# Patient Record
Sex: FEMALE | Employment: OTHER | URBAN - METROPOLITAN AREA
[De-identification: names, ages, dates, MRNs, and addresses within clinical notes are randomized per-mention and may not be internally consistent; named-entity substitution may affect disease eponyms.]

---

## 2023-05-25 LAB
ABO (EXTERNAL): NORMAL
HEPATITIS B SURFACE ANTIGEN (EXTERNAL): NEGATIVE
HEPATITIS C ANTIBODY (EXTERNAL): NEGATIVE
HIV1+2 AB SERPL QL IA: NONREACTIVE
RH (EXTERNAL): NEGATIVE
RUBELLA ANTIBODY IGG (EXTERNAL): NORMAL
TREPONEMA PALLIDUM ANTIBODY (EXTERNAL): NONREACTIVE

## 2023-06-12 ENCOUNTER — TRANSFERRED RECORDS (OUTPATIENT)
Dept: HEALTH INFORMATION MANAGEMENT | Facility: CLINIC | Age: 33
End: 2023-06-12

## 2023-07-19 ENCOUNTER — VIRTUAL VISIT (OUTPATIENT)
Dept: OBGYN | Facility: CLINIC | Age: 33
End: 2023-07-19
Payer: COMMERCIAL

## 2023-07-19 ENCOUNTER — TRANSCRIBE ORDERS (OUTPATIENT)
Dept: ULTRASOUND IMAGING | Facility: CLINIC | Age: 33
End: 2023-07-19

## 2023-07-19 DIAGNOSIS — Z34.90 SUPERVISION OF NORMAL PREGNANCY: Primary | ICD-10-CM

## 2023-07-19 DIAGNOSIS — O26.90 PREGNANCY RELATED CONDITION, ANTEPARTUM: Primary | ICD-10-CM

## 2023-07-19 PROBLEM — G43.909 MIGRAINES: Status: ACTIVE | Noted: 2023-05-25

## 2023-07-19 PROBLEM — O36.8390 FETAL ARRHYTHMIA AFFECTING PREGNANCY, ANTEPARTUM: Status: ACTIVE | Noted: 2020-05-15

## 2023-07-19 PROBLEM — O98.519 COVID-19 AFFECTING PREGNANCY, ANTEPARTUM: Status: ACTIVE | Noted: 2023-05-25

## 2023-07-19 PROBLEM — U07.1 COVID-19 AFFECTING PREGNANCY, ANTEPARTUM: Status: ACTIVE | Noted: 2023-05-25

## 2023-07-19 PROCEDURE — 99207 PR NO CHARGE NURSE ONLY: CPT

## 2023-07-19 RX ORDER — VITAMIN A ACETATE, .BETA.-CAROTENE, ASCORBIC ACID, CHOLECALCIFEROL, .ALPHA.-TOCOPHEROL ACETATE, DL-, THIAMINE MONONITRATE, RIBOFLAVIN, NIACINAMIDE, PYRIDOXINE HYDROCHLORIDE, FOLIC ACID, CYANOCOBALAMIN, CALCIUM CARBONATE, FERROUS FUMARATE, ZINC OXIDE, AND CUPRIC OXIDE 2000; 2000; 120; 400; 22; 1.84; 3; 20; 10; 1; 12; 200; 27; 25; 2 [IU]/1; [IU]/1; MG/1; [IU]/1; MG/1; MG/1; MG/1; MG/1; MG/1; MG/1; UG/1; MG/1; MG/1; MG/1; MG/1
1 TABLET ORAL DAILY
COMMUNITY

## 2023-07-19 NOTE — PROGRESS NOTES
NPN nurse visit done over the phone. Pt will be given NPN folder and book at her upcoming appt.   Discussed optional screening available to assess chromosomal anomalies. Questions answered. Pt advised to call the clinic if she has any questions or concerns related to her pregnancy. Prenatal labs will be obtained at her upcoming appt. New prenatal visit scheduled on 8/2/23 with Ekta Aragon CNM.    16w3d    Last pap: 11/4/20 NIL        Patient supplied answers from flow sheet for:  Prenatal OB Questionnaire.  Past Medical History  Have you ever recieved care for your mental health? : No  Have you ever been in a major accident or suffered serious trauma?: No  Within the last year, has anyone hit, slapped, kicked or otherwise hurt you?: No  In the last year, has anyone forced you to have sex when you didn't want to?: No    Past Medical History 2   Have you ever received a blood transfusion?: No  Would you accept a blood transfusion if was medically recommended?: Yes  Does anyone in your home smoke?: No   Is your blood type Rh negative?: No  Have you ever ?: (!) Yes  Have you been hospitalized for a nonsurgical reason excluding normal delivery?: No  Have you ever had an abnormal pap smear?: (!) Yes (had colp)    Past Medical History (Continued)  Do you have a history of abnormalities of the uterus?: No  Did your mother take LUIS A or any other hormones when she was pregnant with you?: No  Do you have any other problems we have not asked about which you feel may be important to this pregnancy?: No

## 2023-08-02 ENCOUNTER — PRENATAL OFFICE VISIT (OUTPATIENT)
Dept: MIDWIFE SERVICES | Facility: CLINIC | Age: 33
End: 2023-08-02
Payer: COMMERCIAL

## 2023-08-02 ENCOUNTER — PRE VISIT (OUTPATIENT)
Dept: MATERNAL FETAL MEDICINE | Facility: CLINIC | Age: 33
End: 2023-08-02

## 2023-08-02 VITALS
WEIGHT: 136 LBS | DIASTOLIC BLOOD PRESSURE: 60 MMHG | SYSTOLIC BLOOD PRESSURE: 110 MMHG | HEIGHT: 72 IN | BODY MASS INDEX: 18.42 KG/M2

## 2023-08-02 DIAGNOSIS — Z34.82 ENCOUNTER FOR SUPERVISION OF OTHER NORMAL PREGNANCY, SECOND TRIMESTER: Primary | ICD-10-CM

## 2023-08-02 PROCEDURE — 99203 OFFICE O/P NEW LOW 30 MIN: CPT | Performed by: ADVANCED PRACTICE MIDWIFE

## 2023-08-02 NOTE — PROGRESS NOTES
Sushila Shafer is a 33 year old   woman,  who is not a previous CNM patient. She presents for a transfer of care OB Visit. This was a planned pregnancy.     FOB is Yan who is in good health.  FOB IS actively involved in relationship and this pregnancy.     Sushila presents for a transfer of care visit at 18 weeks gestation. Her pregnancy has been uncomplicated to date except for Covid in early pregnancy. She has a history of ocular migraines that seem to be a little worse during the pregnancy.  She has not had bleeding since her LMP.    She denies abdominal pain since her LMP.  She has had nausea.  has not had vomiting.  Any personal or family history of blood clots? No  History of sickle cell anemia or trait? No         Patient's last menstrual period was 2023..  Estimated Date of Delivery: Dec 31, 2023 Ultrasound consistent with LMP.    MENSTRUAL HISTORY    frequency: every 28-30 days  Last PAP:  2020  History of abnormal Pap?  No    Health maintenance updated:  yes        Current medications are:    Current Outpatient Medications:     Prenatal Vit-Fe Fumarate-FA (PNV PRENATAL PLUS MULTIVITAMIN) 27-1 MG TABS per tablet, Take 1 tablet by mouth daily, Disp: , Rfl:      What medications are you currently taking? PNV    INFECTION HISTORY  HIV: No  Hepatitis B: No  Hepatitis C: No  Tuberculosis: No    Genital Herpes self: no  Herpes partner:  no  Chlamydia:  no  Gonorrhea:  no  HPV: No  BV:  No  Syphilis:  No  Chicken Pox:  No      OB HISTORY  OB History    Para Term  AB Living   5 1 1 0 3 1   SAB IAB Ectopic Multiple Live Births   3 0 0 0 1      # Outcome Date GA Lbr Conner/2nd Weight Sex Delivery Anes PTL Lv   5 Current            4 SAB 01/10/23           3 SAB 10/2022           2 SAB 22 7w0d          1 Term 20 41w0d  3.657 kg (8 lb 1 oz) M Vag-Spont   YRIS      Name: Dylan      Apgar1: 8  Apgar5: 9       History of GDM: No,  PTL : No,  History of HTN in  pregnancy: No,  Thrombocytopenia: No,  Shoulder dystocia: No,  Vacuum Extraction: No  PPH: No   3rd of 4th degree laceration: No.   Other complications: No     Low Dose Aspirin for Preeclampsia Prevention:  Consider for those with 1 high risk or 2  moderate risk factors     High risk: previous pregnancy with preeclampsia, multifetal gestation, chronic htn, diabetes, chronic kidney disease, autoimmune disorder     Medium risk: nulliparity, BMI >30, family hx preeclampsia, age >/= 35,  race, low SES, personal risk factors (hx low birth weight, hx stillbirth, >10yrs between pregnancies).      Patient does not qualify for low dose aspirin therapy        PERSONAL HISTORY  Exercise Habits:  walking 4-7 days per week.  Employment: Part time.  Her job involves light activity with no potential for toxic exposure.    Travel plans:  are none planned.   Diet: follows a balanced nutrition diet and allergy to dairy  Prenatal vitamins? Yes:   Fish Oil? Yes:   Abuse concerns? No  Any history if abuse, varbal, physical, sexual? No  Hgb A1c screen:  BMI > 30: No, 1st degree family DM: No, History of GDM: No, PCOS: No, High risk ethnicity: No    Social History     Socioeconomic History    Marital status:      Spouse name: Not on file    Number of children: Not on file    Years of education: Not on file    Highest education level: Not on file   Occupational History    Occupation: works from home   Tobacco Use    Smoking status: Never    Smokeless tobacco: Never   Vaping Use    Vaping Use: Never used   Substance and Sexual Activity    Alcohol use: Not Currently    Drug use: Never    Sexual activity: Yes     Partners: Male   Other Topics Concern    Not on file   Social History Narrative    Not on file     Social Determinants of Health     Financial Resource Strain: Not on file   Food Insecurity: Not on file   Transportation Needs: Not on file   Physical Activity: Not on file   Stress: Not on file   Social  Connections: Not on file   Intimate Partner Violence: Not on file   Housing Stability: Not on file         She  reports that she has never smoked. She has never used smokeless tobacco.    STD testing offered?  Declined  Last PHQ-9 score on record =        No data to display              Last GAD7 score on record =        No data to display              Alcohol Score = 0  Referral/Meds needed? no    PAST MEDICAL/SURGICAL HISTORY  Past Medical History:   Diagnosis Date    Ocular migraine      Past Surgical History:   Procedure Laterality Date    MAMMOPLASTY AUGMENTATION  2014       FAMILY HISTORY  Family History   Problem Relation Age of Onset    No Known Problems Mother     No Known Problems Father          ROS:  CONSTITUTIONAL: NEGATIVE for fever, chills, change in weight  INTEGUMENTARU/SKIN: NEGATIVE for worrisome rashes, moles or lesions  EYES: NEGATIVE for vision changes or irritation  ENT: NEGATIVE for ear, mouth and throat problems  RESP: NEGATIVE for significant cough or SOB  BREAST: NEGATIVE for masses, tenderness or discharge  CV: NEGATIVE for chest pain, palpitations or peripheral edema  GI: NEGATIVE for nausea, abdominal pain, heartburn, or change in bowel habits  : NEGATIVE for unusual urinary or vaginal symptoms. Periods are regular.  MUSCULOSKELETAL: NEGATIVE for significant arthralgias or myalgia  NEURO: NEGATIVE for weakness, dizziness or paresthesias  ENDOCRINE: NEGATIVE for temperature intolerance, skin/hair changes  HEME/ALLERGY/IMMUNE: NEGATIVE for bleeding problems  PSYCHIATRIC: NEGATIVE for changes in mood or affect    PHYSICAL EXAM  Vitals: /60   Ht 1.829 m (6')   Wt 61.7 kg (136 lb)   LMP 03/26/2023   BMI 18.44 kg/m    BMI= Body mass index is 18.44 kg/m .     GENERAL:  33 year old pleasant pregnant female, alert, cooperative and well groomed.  ABDOMEN: Soft without masses or tenderness.  Fundus measures appropriately for gestational age. FHTs heard without difficulty  PELVIC:  Deferred  LOWER EXTREMITIES: No edema. No significant varicosities.    ASSESSMENT/PLAN:    IUP at 18w3d      ICD-10-CM    1. Encounter for supervision of other normal pregnancy, second trimester  Z34.82               consult for US for AMA patients: Patient is seeing M for anatomy scan due to possible placenta anomaly   Genetic Testing reviewed and discussed, patient had normal NIPT.      COUNSELING  Instructed on use of triage nurse line and contacting the on call CNM after hours in an emergency.   Symptoms of N&V and fatigue usually start to resolve around 12-16 weeks   Reviewed CNM philosophy, call schedule for labor and delivery, and [facility] for delivery  1st OB handout given outlining appointment spacing and CNM information  Reviewed exercise and nutrition  Recommend to gain 25-35 pounds with her pregnancy.  Discussed OTC medications. OB med list given  Encouraged patient to arrange  if needed  Encouraged patient to take PNV's/DHA  Travel precautions discussed, no air travel after 36 weeks and Zika Virus discussed  Will call patient with lab results when available        Will return to the clinic in 4 weeks for her next routine prenatal check.  Will call to be seen sooner if problems arise.    SOFY Mayorga, CNM

## 2023-08-11 ENCOUNTER — HOSPITAL ENCOUNTER (OUTPATIENT)
Dept: ULTRASOUND IMAGING | Facility: CLINIC | Age: 33
Discharge: HOME | End: 2023-08-11
Attending: ADVANCED PRACTICE MIDWIFE
Payer: COMMERCIAL

## 2023-08-11 ENCOUNTER — OFFICE VISIT (OUTPATIENT)
Dept: MATERNAL FETAL MEDICINE | Facility: CLINIC | Age: 33
End: 2023-08-11
Attending: ADVANCED PRACTICE MIDWIFE
Payer: COMMERCIAL

## 2023-08-11 DIAGNOSIS — O43.112 CIRCUMVALLATE PLACENTA IN SECOND TRIMESTER: Primary | ICD-10-CM

## 2023-08-11 DIAGNOSIS — O26.90 PREGNANCY RELATED CONDITION, ANTEPARTUM: ICD-10-CM

## 2023-08-11 PROBLEM — O43.119 CIRCUMVALLATE PLACENTA: Status: ACTIVE | Noted: 2023-08-11

## 2023-08-11 PROCEDURE — 99203 OFFICE O/P NEW LOW 30 MIN: CPT | Mod: 25 | Performed by: OBSTETRICS & GYNECOLOGY

## 2023-08-11 PROCEDURE — 76811 OB US DETAILED SNGL FETUS: CPT

## 2023-08-11 PROCEDURE — 76811 OB US DETAILED SNGL FETUS: CPT | Mod: 26 | Performed by: OBSTETRICS & GYNECOLOGY

## 2023-08-11 NOTE — PROGRESS NOTES
"Please see \"Imaging\" tab under \"Chart Review\" for details of today's visit.    Carlene Cruz MD PhD  Maternal Fetal Medicine     "

## 2023-08-11 NOTE — NURSING NOTE
Patient presents to [facility] for L2 at 19w5d due to abnormal placental vessels. Positive fetal movement. Denies LOF, vaginal bleeding or cramping/contractions. SBAR given to ARTURO MD, see their note in Epic.

## 2023-09-07 ENCOUNTER — PRENATAL OFFICE VISIT (OUTPATIENT)
Dept: OBGYN | Facility: CLINIC | Age: 33
End: 2023-09-07
Payer: COMMERCIAL

## 2023-09-07 VITALS — WEIGHT: 144.1 LBS | DIASTOLIC BLOOD PRESSURE: 70 MMHG | SYSTOLIC BLOOD PRESSURE: 102 MMHG | BODY MASS INDEX: 19.54 KG/M2

## 2023-09-07 DIAGNOSIS — Z34.80 SUPERVISION OF OTHER NORMAL PREGNANCY, ANTEPARTUM: Primary | ICD-10-CM

## 2023-09-07 PROCEDURE — 99212 OFFICE O/P EST SF 10 MIN: CPT | Performed by: OBSTETRICS & GYNECOLOGY

## 2023-09-07 NOTE — PROGRESS NOTES
IUP at 23w4d,    Appropriate weight gain.   Circumvallate placenta: serial growths  Reviewed kick counts, warning signs.   Return to clinic 4 weeks, GCT/CBC/RPR at that time.     Christin Gardner MD

## 2023-09-07 NOTE — NURSING NOTE
Chief Complaint   Patient presents with    Prenatal Care     23 weeks 4 days, no c/o VB, LoF, contractions. Feeling FM daily. Some minor cramping occasionally.     Weight Problem     Patient has questions regarding current weight gain.    Imm/Inj     Patient has questions regarding Covid vaccines and immunity, she had covid in early pregnancy.    Results     From last US on 23       Initial /70   Wt 65.4 kg (144 lb 1.6 oz)   LMP 2023   BMI 19.54 kg/m   Estimated body mass index is 19.54 kg/m  as calculated from the following:    Height as of 23: 1.829 m (6').    Weight as of this encounter: 65.4 kg (144 lb 1.6 oz).  BP completed using cuff size: regular    Questioned patient about current smoking habits.  Pt. has never smoked.          The following HM Due: NONE    Victoria Jacobs CMA

## 2023-09-27 ENCOUNTER — OFFICE VISIT (OUTPATIENT)
Dept: MATERNAL FETAL MEDICINE | Facility: CLINIC | Age: 33
End: 2023-09-27
Attending: OBSTETRICS & GYNECOLOGY
Payer: COMMERCIAL

## 2023-09-27 ENCOUNTER — HOSPITAL ENCOUNTER (OUTPATIENT)
Dept: ULTRASOUND IMAGING | Facility: CLINIC | Age: 33
Discharge: HOME | End: 2023-09-27
Attending: OBSTETRICS & GYNECOLOGY
Payer: COMMERCIAL

## 2023-09-27 DIAGNOSIS — O43.112 CIRCUMVALLATE PLACENTA IN SECOND TRIMESTER: ICD-10-CM

## 2023-09-27 DIAGNOSIS — O43.113 CIRCUMVALLATE PLACENTA IN THIRD TRIMESTER: Primary | ICD-10-CM

## 2023-09-27 PROCEDURE — 76816 OB US FOLLOW-UP PER FETUS: CPT

## 2023-09-27 PROCEDURE — 76816 OB US FOLLOW-UP PER FETUS: CPT | Mod: 26 | Performed by: OBSTETRICS & GYNECOLOGY

## 2023-09-27 NOTE — PROGRESS NOTES
Please see full imaging report from [employer] program under imaging tab.    Mag Taylor MD  Maternal Fetal Medicine

## 2023-10-14 ENCOUNTER — HOSPITAL ENCOUNTER (INPATIENT)
Facility: CLINIC | Age: 33
LOS: 19 days | Discharge: HOME | End: 2023-11-02
Attending: FAMILY MEDICINE | Admitting: FAMILY MEDICINE
Payer: COMMERCIAL

## 2023-10-14 ENCOUNTER — APPOINTMENT (OUTPATIENT)
Dept: ULTRASOUND IMAGING | Facility: CLINIC | Age: 33
End: 2023-10-14
Attending: FAMILY MEDICINE
Payer: COMMERCIAL

## 2023-10-14 ENCOUNTER — NURSE TRIAGE (OUTPATIENT)
Dept: NURSING | Facility: CLINIC | Age: 33
End: 2023-10-14
Payer: COMMERCIAL

## 2023-10-14 DIAGNOSIS — O42.919 PRETERM PREMATURE RUPTURE OF MEMBRANES (PPROM) WITH UNKNOWN ONSET OF LABOR: Primary | ICD-10-CM

## 2023-10-14 DIAGNOSIS — Z98.891 S/P CESAREAN SECTION: ICD-10-CM

## 2023-10-14 LAB
ABO/RH(D): NORMAL
AMPHETAMINES UR QL SCN: NORMAL
ANTIBODY SCREEN: NEGATIVE
BARBITURATES UR QL SCN: NORMAL
BASO+EOS+MONOS # BLD AUTO: ABNORMAL 10*3/UL
BASO+EOS+MONOS NFR BLD AUTO: ABNORMAL %
BASOPHILS # BLD AUTO: 0.1 10E3/UL (ref 0–0.2)
BASOPHILS NFR BLD AUTO: 0 %
BENZODIAZ UR QL SCN: NORMAL
BZE UR QL SCN: NORMAL
CANNABINOIDS UR QL SCN: NORMAL
CLUE CELLS: NORMAL
CREAT SERPL-MCNC: 0.46 MG/DL (ref 0.51–0.95)
CRYSTALS AMN MICRO: ABNORMAL
EGFRCR SERPLBLD CKD-EPI 2021: >90 ML/MIN/1.73M2
EOSINOPHIL # BLD AUTO: 0 10E3/UL (ref 0–0.7)
EOSINOPHIL NFR BLD AUTO: 0 %
ERYTHROCYTE [DISTWIDTH] IN BLOOD BY AUTOMATED COUNT: 13.1 % (ref 10–15)
FENTANYL UR QL: NORMAL
FETAL BLEED SCREEN: NORMAL
GLUCOSE 1H P 50 G GLC PO SERPL-MCNC: 228 MG/DL (ref 70–129)
HCT VFR BLD AUTO: 42.6 % (ref 35–47)
HGB BLD-MCNC: 15 G/DL (ref 11.7–15.7)
IMM GRANULOCYTES # BLD: 0.1 10E3/UL
IMM GRANULOCYTES NFR BLD: 1 %
LYMPHOCYTES # BLD AUTO: 1.1 10E3/UL (ref 0.8–5.3)
LYMPHOCYTES NFR BLD AUTO: 7 %
MCH RBC QN AUTO: 31.8 PG (ref 26.5–33)
MCHC RBC AUTO-ENTMCNC: 35.2 G/DL (ref 31.5–36.5)
MCV RBC AUTO: 90 FL (ref 78–100)
MONOCYTES # BLD AUTO: 0.4 10E3/UL (ref 0–1.3)
MONOCYTES NFR BLD AUTO: 2 %
NEUTROPHILS # BLD AUTO: 13.5 10E3/UL (ref 1.6–8.3)
NEUTROPHILS NFR BLD AUTO: 90 %
NRBC # BLD AUTO: 0 10E3/UL
NRBC BLD AUTO-RTO: 0 /100
OPIATES UR QL SCN: NORMAL
PCP QUAL URINE (ROCHE): NORMAL
PLATELET # BLD AUTO: 241 10E3/UL (ref 150–450)
RBC # BLD AUTO: 4.71 10E6/UL (ref 3.8–5.2)
SPECIMEN EXPIRATION DATE: NORMAL
SPECIMEN EXPIRATION DATE: NORMAL
TRICHOMONAS, WET PREP: NORMAL
WBC # BLD AUTO: 15.1 10E3/UL (ref 4–11)
WBC'S/HIGH POWER FIELD, WET PREP: NORMAL
YEAST, WET PREP: NORMAL

## 2023-10-14 PROCEDURE — 85461 HEMOGLOBIN FETAL: CPT | Performed by: FAMILY MEDICINE

## 2023-10-14 PROCEDURE — 82565 ASSAY OF CREATININE: CPT | Performed by: FAMILY MEDICINE

## 2023-10-14 PROCEDURE — 99222 1ST HOSP IP/OBS MODERATE 55: CPT | Performed by: FAMILY MEDICINE

## 2023-10-14 PROCEDURE — 86780 TREPONEMA PALLIDUM: CPT | Performed by: FAMILY MEDICINE

## 2023-10-14 PROCEDURE — 80307 DRUG TEST PRSMV CHEM ANLYZR: CPT | Performed by: FAMILY MEDICINE

## 2023-10-14 PROCEDURE — 85025 COMPLETE CBC W/AUTO DIFF WBC: CPT | Performed by: FAMILY MEDICINE

## 2023-10-14 PROCEDURE — 87491 CHLMYD TRACH DNA AMP PROBE: CPT | Performed by: FAMILY MEDICINE

## 2023-10-14 PROCEDURE — 82950 GLUCOSE TEST: CPT | Performed by: FAMILY MEDICINE

## 2023-10-14 PROCEDURE — 258N000003 HC RX IP 258 OP 636: Performed by: FAMILY MEDICINE

## 2023-10-14 PROCEDURE — 86901 BLOOD TYPING SEROLOGIC RH(D): CPT | Performed by: FAMILY MEDICINE

## 2023-10-14 PROCEDURE — 76816 OB US FOLLOW-UP PER FETUS: CPT

## 2023-10-14 PROCEDURE — 120N000001 HC R&B MED SURG/OB

## 2023-10-14 PROCEDURE — 87653 STREP B DNA AMP PROBE: CPT | Performed by: FAMILY MEDICINE

## 2023-10-14 PROCEDURE — 250N000011 HC RX IP 250 OP 636: Performed by: FAMILY MEDICINE

## 2023-10-14 PROCEDURE — 87210 SMEAR WET MOUNT SALINE/INK: CPT | Performed by: FAMILY MEDICINE

## 2023-10-14 RX ORDER — CLINDAMYCIN PHOSPHATE 900 MG/50ML
900 INJECTION, SOLUTION INTRAVENOUS EVERY 8 HOURS
Status: COMPLETED | OUTPATIENT
Start: 2023-10-14 | End: 2023-10-16

## 2023-10-14 RX ORDER — METOCLOPRAMIDE 10 MG/1
10 TABLET ORAL EVERY 6 HOURS PRN
Status: DISCONTINUED | OUTPATIENT
Start: 2023-10-14 | End: 2023-10-30 | Stop reason: HOSPADM

## 2023-10-14 RX ORDER — DEXTROSE, SODIUM CHLORIDE, SODIUM LACTATE, POTASSIUM CHLORIDE, AND CALCIUM CHLORIDE 5; .6; .31; .03; .02 G/100ML; G/100ML; G/100ML; G/100ML; G/100ML
INJECTION, SOLUTION INTRAVENOUS CONTINUOUS
Status: DISCONTINUED | OUTPATIENT
Start: 2023-10-14 | End: 2023-10-30 | Stop reason: HOSPADM

## 2023-10-14 RX ORDER — MAGNESIUM HYDROXIDE/ALUMINUM HYDROXICE/SIMETHICONE 120; 1200; 1200 MG/30ML; MG/30ML; MG/30ML
30 SUSPENSION ORAL
Status: DISCONTINUED | OUTPATIENT
Start: 2023-10-14 | End: 2023-10-30 | Stop reason: HOSPADM

## 2023-10-14 RX ORDER — CEFAZOLIN SODIUM 1 G/3ML
1 INJECTION, POWDER, FOR SOLUTION INTRAMUSCULAR; INTRAVENOUS EVERY 8 HOURS
Status: COMPLETED | OUTPATIENT
Start: 2023-10-14 | End: 2023-10-16

## 2023-10-14 RX ORDER — DIPHENHYDRAMINE HCL 25 MG
25 CAPSULE ORAL EVERY 6 HOURS PRN
Status: DISCONTINUED | OUTPATIENT
Start: 2023-10-14 | End: 2023-10-30 | Stop reason: HOSPADM

## 2023-10-14 RX ORDER — ONDANSETRON 4 MG/1
4 TABLET, ORALLY DISINTEGRATING ORAL EVERY 6 HOURS PRN
Status: DISCONTINUED | OUTPATIENT
Start: 2023-10-14 | End: 2023-10-30 | Stop reason: HOSPADM

## 2023-10-14 RX ORDER — PRENATAL VIT/IRON FUM/FOLIC AC 27MG-0.8MG
1 TABLET ORAL DAILY
Status: DISCONTINUED | OUTPATIENT
Start: 2023-10-14 | End: 2023-10-16

## 2023-10-14 RX ORDER — PROCHLORPERAZINE MALEATE 10 MG
10 TABLET ORAL EVERY 6 HOURS PRN
Status: DISCONTINUED | OUTPATIENT
Start: 2023-10-14 | End: 2023-10-30 | Stop reason: HOSPADM

## 2023-10-14 RX ORDER — PROCHLORPERAZINE 25 MG
25 SUPPOSITORY, RECTAL RECTAL EVERY 12 HOURS PRN
Status: DISCONTINUED | OUTPATIENT
Start: 2023-10-14 | End: 2023-10-30 | Stop reason: HOSPADM

## 2023-10-14 RX ORDER — CLINDAMYCIN HCL 150 MG
450 CAPSULE ORAL 3 TIMES DAILY
Status: DISCONTINUED | OUTPATIENT
Start: 2023-10-16 | End: 2023-10-16

## 2023-10-14 RX ORDER — DIPHENHYDRAMINE HYDROCHLORIDE 50 MG/ML
25 INJECTION INTRAMUSCULAR; INTRAVENOUS EVERY 6 HOURS PRN
Status: DISCONTINUED | OUTPATIENT
Start: 2023-10-14 | End: 2023-10-30 | Stop reason: HOSPADM

## 2023-10-14 RX ORDER — DIPHENHYDRAMINE HYDROCHLORIDE 50 MG/ML
50 INJECTION INTRAMUSCULAR; INTRAVENOUS
Status: DISCONTINUED | OUTPATIENT
Start: 2023-10-15 | End: 2023-10-30

## 2023-10-14 RX ORDER — ONDANSETRON 2 MG/ML
4 INJECTION INTRAMUSCULAR; INTRAVENOUS EVERY 6 HOURS PRN
Status: DISCONTINUED | OUTPATIENT
Start: 2023-10-14 | End: 2023-10-30 | Stop reason: HOSPADM

## 2023-10-14 RX ORDER — BETAMETHASONE SODIUM PHOSPHATE AND BETAMETHASONE ACETATE 3; 3 MG/ML; MG/ML
12 INJECTION, SUSPENSION INTRA-ARTICULAR; INTRALESIONAL; INTRAMUSCULAR; SOFT TISSUE EVERY 24 HOURS
Qty: 4 ML | Refills: 0 | Status: COMPLETED | OUTPATIENT
Start: 2023-10-14 | End: 2023-10-15

## 2023-10-14 RX ORDER — CLINDAMYCIN HCL 150 MG
300 CAPSULE ORAL EVERY 6 HOURS SCHEDULED
Status: DISCONTINUED | OUTPATIENT
Start: 2023-10-16 | End: 2023-10-14

## 2023-10-14 RX ORDER — DIPHENHYDRAMINE HCL 25 MG
50 CAPSULE ORAL
Status: DISCONTINUED | OUTPATIENT
Start: 2023-10-15 | End: 2023-10-30

## 2023-10-14 RX ORDER — SIMETHICONE 80 MG
160 TABLET,CHEWABLE ORAL 4 TIMES DAILY PRN
Status: DISCONTINUED | OUTPATIENT
Start: 2023-10-14 | End: 2023-10-30 | Stop reason: HOSPADM

## 2023-10-14 RX ORDER — AMOXICILLIN 250 MG
2 CAPSULE ORAL 2 TIMES DAILY
Status: DISCONTINUED | OUTPATIENT
Start: 2023-10-14 | End: 2023-10-16

## 2023-10-14 RX ORDER — HYDROXYZINE HYDROCHLORIDE 50 MG/1
50 TABLET, FILM COATED ORAL
Status: DISCONTINUED | OUTPATIENT
Start: 2023-10-14 | End: 2023-10-30 | Stop reason: HOSPADM

## 2023-10-14 RX ORDER — CLINDAMYCIN PHOSPHATE 900 MG/50ML
900 INJECTION, SOLUTION INTRAVENOUS EVERY 8 HOURS
Status: DISCONTINUED | OUTPATIENT
Start: 2023-10-14 | End: 2023-10-14

## 2023-10-14 RX ORDER — CEFAZOLIN SODIUM 1 G/3ML
1 INJECTION, POWDER, FOR SOLUTION INTRAMUSCULAR; INTRAVENOUS EVERY 8 HOURS
Qty: 30 ML | Refills: 0 | Status: DISCONTINUED | OUTPATIENT
Start: 2023-10-14 | End: 2023-10-14

## 2023-10-14 RX ORDER — AMOXICILLIN 250 MG
1 CAPSULE ORAL 2 TIMES DAILY
Status: DISCONTINUED | OUTPATIENT
Start: 2023-10-14 | End: 2023-10-16

## 2023-10-14 RX ORDER — METOCLOPRAMIDE HYDROCHLORIDE 5 MG/ML
10 INJECTION INTRAMUSCULAR; INTRAVENOUS EVERY 6 HOURS PRN
Status: DISCONTINUED | OUTPATIENT
Start: 2023-10-14 | End: 2023-10-30 | Stop reason: HOSPADM

## 2023-10-14 RX ORDER — PANTOPRAZOLE SODIUM 40 MG/1
40 TABLET, DELAYED RELEASE ORAL
Status: DISCONTINUED | OUTPATIENT
Start: 2023-10-15 | End: 2023-10-22

## 2023-10-14 RX ORDER — ACETAMINOPHEN 325 MG/1
650 TABLET ORAL EVERY 4 HOURS PRN
Status: DISCONTINUED | OUTPATIENT
Start: 2023-10-14 | End: 2023-10-30 | Stop reason: HOSPADM

## 2023-10-14 RX ADMIN — GENTAMICIN SULFATE 130 MG: 40 INJECTION, SOLUTION INTRAMUSCULAR; INTRAVENOUS at 15:41

## 2023-10-14 RX ADMIN — CEFAZOLIN 1 G: 1 INJECTION, POWDER, FOR SOLUTION INTRAMUSCULAR; INTRAVENOUS at 22:02

## 2023-10-14 RX ADMIN — CLINDAMYCIN PHOSPHATE 900 MG: 900 INJECTION, SOLUTION INTRAVENOUS at 22:42

## 2023-10-14 RX ADMIN — GENTAMICIN SULFATE 130 MG: 40 INJECTION, SOLUTION INTRAMUSCULAR; INTRAVENOUS at 23:44

## 2023-10-14 RX ADMIN — CEFAZOLIN 1 G: 1 INJECTION, POWDER, FOR SOLUTION INTRAMUSCULAR; INTRAVENOUS at 14:09

## 2023-10-14 RX ADMIN — HUMAN RHO(D) IMMUNE GLOBULIN 300 MCG: 1500 SOLUTION INTRAMUSCULAR; INTRAVENOUS at 19:17

## 2023-10-14 RX ADMIN — CLINDAMYCIN PHOSPHATE 900 MG: 900 INJECTION, SOLUTION INTRAVENOUS at 14:49

## 2023-10-14 RX ADMIN — BETAMETHASONE SODIUM PHOSPHATE AND BETAMETHASONE ACETATE 12 MG: 3; 3 INJECTION, SUSPENSION INTRA-ARTICULAR; INTRALESIONAL; INTRAMUSCULAR at 12:30

## 2023-10-14 ASSESSMENT — ACTIVITIES OF DAILY LIVING (ADL)
ADLS_ACUITY_SCORE: 35

## 2023-10-14 NOTE — PROVIDER NOTIFICATION
10/14/23 0846   Provider Notification   Provider Name/Title Reese Quezada   Method of Notification At Bedside     Notified of patient arrival. Reports a gush of fluid this morning with some cramping and bleeding. Fern collected, patient noted to be grossly ruptured.     Order to send fern, will come to bedside to assess.

## 2023-10-14 NOTE — PROGRESS NOTES
S:  Two,  1 minute and 2 minutes decelerations noted,    O:/63   Temp 98.8  F (37.1  C) (Oral)   Resp 16   LMP 2023      FHT's Category 2 with baseline 135    A:  33 year old y/o  @ 28w6d  wks EGA with PPROM and GBS unknown,   Category 1 recently, but longer decels noted about 30 minutes prior.    P:  Reassuring fetal status, continue continuous monitoring.     Dr. Kaylah Quezada, DO    OB/GYN   [facility]

## 2023-10-14 NOTE — PHARMACY-CONSULT NOTE
10/14/23: Dr. Beth reached out to Pharmacy for a consult on antibiotics for PPROM Prophylaxis. Patient with PPROM at 28w6d and allergic to azithromycin (rash) & PCN (rash); I recommended to NOT USE erythromycin as potential for cross-reactivity; Alternative antibiotics recommended were: Clindamycin 900 mg IV q8h x 6 doses, then 450 mg PO TID x 15 doses; Gentamicin 2 mg/kg (130 mg) IV q8h x 6 doses; and Cefazolin 1 gm IV q8h x 6 doses, then Keflex 500 mg PO QID X 5 days.   Heidi Thompson [facility] on 10/14/2023 at 11:57 AM

## 2023-10-14 NOTE — PROVIDER NOTIFICATION
10/14/23 1420   Provider Notification   Provider Name/Title Reese Quezada   Method of Notification Electronic Page   Request Evaluate - Remote   Notification Reason Status Update     Clarifying whether provider would like a trep screen collected. US resulted. 2 potential deep variabile decels with danelle in the 70s noted.     Order for trep screen, provider will look at US and uterine/fetal monitor.

## 2023-10-14 NOTE — PROVIDER NOTIFICATION
10/14/23 1440   Provider Notification   Provider Name/Title Dr Quezada   Method of Notification In Department     MD reviewed fetal/uterine strip. Verbal consent for regular adult diet order.

## 2023-10-14 NOTE — TELEPHONE ENCOUNTER
"    OB Triage Call      Is patient's OB/Midwife with the [facility] or [facility]? [facility]- Proceed with triage     Reason for call: Pt is reporting her water broke this morning about five minutes ago    Assessment:   28 weeks pregnant  Water broke this morning about \"five minutes ago\"; gush of clear fluids and now there is some bloody discharge  Cramping; possible contractions, but this started five minutes ago so patient is unable to report how frequently the cramping is taking place or differentiate this cramping from contractions    Plan: Disposition states to Go to L and D now    Patient plans to deliver at [facility]     Patient's primary OB Provider is   Rajiv Gardner MD   .      Per protocol recommendations Patient to be evaluated in L&D. Patient's primary OB is [facility].  Labor and delivery at [facility] ([phone]) notified of patient's pending arrival.  Report given to Charge Nurse.      Is patient's delivering hospital on divert? No      28w6d    Estimated Date of Delivery: Dec 31, 2023        OB History    Para Term  AB Living   5 1 1 0 3 1   SAB IAB Ectopic Multiple Live Births   3 0 0 0 1      # Outcome Date GA Lbr Conner/2nd Weight Sex Delivery Anes PTL Lv   5 Current            4 SAB 01/10/23           3 SAB 10/2022           2 SAB 22 7w0d          1 Term 20 41w0d  3.657 kg (8 lb 1 oz) M Vag-Spont   YRIS      Name: Dylan      Apgar1: 8  Apgar5: 9       No results found for: \"GBS\"       Janet Anand RN 10/14/23 7:31 AM  [facility] Nurse Advisor    Reason for Disposition   [1] Contractions AND [2] any vaginal bleeding (including: red blood, clots, spotting, or pink/brown mucous)   Vaginal bleeding  (Exception: Brief spotting after intercourse or pelvic exam, or slight pinkish or brownish mucous discharge.)   Leakage of fluid from vagina    Additional Information   Negative: Passed out (i.e., lost consciousness, collapsed and was not responding)   " "Negative: Shock suspected (e.g., cold/pale/clammy skin, too weak to stand, low BP, rapid pulse)   Negative: Difficult to awaken or acting confused (e.g., disoriented, slurred speech)   Negative: [1] SEVERE abdominal pain (e.g., excruciating) AND [2] constant AND [3] present > 1 hour   Negative: SEVERE bleeding (e.g., continuous red blood from vagina, or large blood clots)   Negative: Umbilical cord hanging out of the vagina (shiny, white, curled appearance, \"like telephone cord\")   Negative: Uncontrollable urge to push (i.e., feels like baby is coming out now)   Negative: Can see baby   Negative: Sounds like a life-threatening emergency to the triager   Negative: MODERATE-SEVERE abdominal pain   Negative: [1] Contractions < 10 minutes apart AND [2] persist > 1 hour  (i.e., 6 or more contractions an hour)   Negative: [1] Contractions > 10 minutes apart AND [2] persist > 24 hours AND [3] no improvement using Care Advice    Protocols used: Pregnancy - Labor - [id number]    "

## 2023-10-14 NOTE — PROVIDER NOTIFICATION
10/14/23 0846   Provider Notification   Provider Name/Title Reese Quezada   Method of Notification At Bedside     MD will put inpatient orders in. Ok for RN to order Rhogam after type and screen resulted. Patient desires Flu, TDAP, and COVID vaccine, was supposed to receive in clinic next week; ok for RN to order these vaccines if patient desires, ok to space them out per patient preference. Patient was also supposed to do glucose test this upcoming week, ok for RN to order glucose test. Will order 2 doses of BMZ, first dose to be administered today.

## 2023-10-14 NOTE — H&P
"[facility] Labor and Delivery History and Physical    Sushila Shafer MRN# [id number]   Age: 33 year old YOB: 1990     Date of Admission:  10/14/2023    Primary care provider: No Ref-Primary, Physician           Chief Complaint:   Sushila Shafer is a 33 year old female who is  @ 28w6d pregnant and being admitted for PPROM, grossly ruptured, confirmed by + FERN. PCN and zithromax allergic.  O negative.           Pregnancy history:     OBSTETRIC HISTORY:    OB History    Para Term  AB Living   5 1 1 0 3 1   SAB IAB Ectopic Multiple Live Births   3 0 0 0 1      # Outcome Date GA Lbr Conner/2nd Weight Sex Delivery Anes PTL Lv   5 Current            4 SAB 01/10/23           3 SAB 10/2022           2 SAB 22 7w0d          1 Term 20 41w0d  3.657 kg (8 lb 1 oz) M Vag-Spont   YRIS      Name: Dylan      Apgar1: 8  Apgar5: 9       EDC: Estimated Date of Delivery: Dec 31, 2023    Prenatal Labs: No results found for: \"ABO\", \"RH\", \"AS\", \"HEPBANG\", \"CHPCRT\", \"GCPCRT\", \"TREPAB\", \"RUBELLAABIGG\", \"HGB\", \"HIV\"    GBS Status:   No results found for: \"GBS\"    Active Problem List  Patient Active Problem List   Diagnosis    Abnormal Pap smear of cervix    COVID-19 affecting pregnancy, antepartum    Fetal arrhythmia affecting pregnancy, antepartum    Migraines    Circumvallate placenta       Medication Prior to Admission  Medications Prior to Admission   Medication Sig Dispense Refill Last Dose    Prenatal Vit-Fe Fumarate-FA (PNV PRENATAL PLUS MULTIVITAMIN) 27-1 MG TABS per tablet Take 1 tablet by mouth daily   10/13/2023   .        Maternal Past Medical History:     Past Medical History:   Diagnosis Date    Ocular migraine                        Family History:   This patient has no significant family history            Social History:   This patient has no significant social history         Review of Systems:   CONSTITUTIONAL: NEGATIVE for fever, chills, change in " weight  INTEGUMENTARY/SKIN: NEGATIVE for worrisome rashes, moles or lesions  EYES: NEGATIVE for vision changes or irritation  ENT/MOUTH: NEGATIVE for ear, mouth and throat problems  RESP: NEGATIVE for significant cough or SOB  BREAST: NEGATIVE for masses, tenderness or discharge  CV: NEGATIVE for chest pain, palpitations or peripheral edema  GI: NEGATIVE for nausea, abdominal pain, heartburn, or change in bowel habits  : NEGATIVE for frequency, dysuria, or hematuria  MUSCULOSKELETAL: NEGATIVE for significant arthralgias or myalgia  NEURO: NEGATIVE for weakness, dizziness or paresthesias  ENDOCRINE: NEGATIVE for temperature intolerance, skin/hair changes  HEME: NEGATIVE for bleeding problems  PSYCHIATRIC: NEGATIVE for changes in mood or affect          Physical Exam:   Vitals were reviewed  All vitals stable  Patient Vitals for the past 8 hrs:   BP Temp Temp src Resp   10/14/23 0817 121/74 98.7  F (37.1  C) Oral 16     Constitutional: Awake, alert, cooperative, no apparent distress, and appears stated age.  Eyes: Lids and lashes normal, pupils equal, round and reactive to light, extra ocular muscles intact, sclera clear, conjunctiva normal.  ENT: Normocephalic, without obvious abnormality, atramatic, sinuses nontender on palpation, external ears without lesions, oral pharynx with moist mucus membranes, tonsils without erythema or exudates, gums normal and good dentition.  Neck: Supple, symmetrical, trachea midline, no adenopathy, thyroid symmetric, not enlarged and no tenderness, skin normal.  Hematologic / Lymphatic: No cervical lymphadenopathy and no supraclavicular lymphadenopathy.  Back: Symmetric, no curvature, spinous processes are non-tender on palpation, paraspinous muscles are non-tender on palpation, no costal vertebral tenderness.  Lungs: No increased work of breathing, good air exchange, clear to auscultation bilaterally, no crackles or wheezing.  Cardiovascular: Regular rate and rhythm, normal S1 and  S2, no S3 or S4, and no murmur noted.  Abdomen: No scars, normal bowel sounds, soft, non-distended, non-tender, no masses palpated, no hepatosplenomegally.  Genitourinary: No urethral discharge, normal external genitalia, no hernia.  Musculoskeletal: No redness, warmth, or swelling of the joints.  Full range of motion noted.  Motor strength is 5 out of 5 all extremities bilaterally.  Tone is normal.  Neurologic: Awake, alert, oriented to name, place and time.  Cranial nerves II-XII are grossly intact.  Motor is 5 out of 5 bilaterally.  Cerebellar finger to nose, heel to shin intact.  Sensory is intact.  Babinski down going, Romberg negative, and gait is normal.  Neuropsychiatric: Normal affect, mood, orientation, memory and insight.  Skin: No rashes, erythema, pallor, petechia or purpura.   Cervix:   Membranes: PPROM   Dilation: closed   Effacement: Deferred   Station:FLOATING   Consistency: deferred   Position: Mid  Presentation:Cephalic  Fetal Heart Rate Tracing: Tier 2 due no accelerations, some variable decelerations,   Difficult monitoring due to prematurity  Tocometer: no contractions    Bedside us with cephalic, MVP 7 cm, good fetal movement                        Assessment:   Sushila Shafer is a 33 year old female who is  @ 28w6d pregnant and being admitted for PPROM, grossly ruptured, confirmed by + FERN. PCN and zithromax allergic.  O negative.         Plan:   Admit - see IP orders  IV antibiotics, she would like to try PCN if possible, unsure what the reaction was, possibly rash.  Zithromax caused a rash 5 years ago,  discussed with MFM they recommend to use  Ancef 1 gm q 8 x 48 hours, then 500 mg po 4 x daily x 5 days and discuss zithromax alternative with pharmacy, possible erythromycin vs clindamycin (which has been used as alternative in the [place] for zithromax allergy). Pharmacy thinks erythromycin will cause a rash so they recommend clindamycin.  Pharmacy also recommends adding gentamicin  to ensure good coverage of pathogens. These orders are placed.     BMZ for prematurity   Continuous monitoring x 24-48 hours   NICU consultation,   Comfort cares for mom   Covid/flu/tdap vaccinations   1 hour glucose today prior to BMZ   Rhogam today     MFM consult with ultrasound and recommendations formally this week per schedule.   Discussed case with MFM now with above recommendations.       50 minutes spent on the date of the encounter doing chart review, review of test results, interpretation of tests, patient visit, documentation, and discussion with other provider(s)              Kaylah Quezada, DO

## 2023-10-14 NOTE — PROVIDER NOTIFICATION
10/14/23 1757   Provider Notification   Provider Name/Title Dr Quezada   Method of Notification Electronic Page   Request Evaluate - Remote   Notification Reason Status Update     6 potential variable decels noted in a 30 minute period, danelle in the 50s-60s; broken tracing. Baseline heart rate WNL, moderate variability, with accels. No contractions noted.     MD will review uterine/fetal strip. Can try repositioning.

## 2023-10-15 LAB
C TRACH DNA SPEC QL PROBE+SIG AMP: NEGATIVE
GLUCOSE BLDC GLUCOMTR-MCNC: 110 MG/DL (ref 70–99)
GLUCOSE BLDC GLUCOMTR-MCNC: 117 MG/DL (ref 70–99)
GLUCOSE BLDC GLUCOMTR-MCNC: 130 MG/DL (ref 70–99)
GP B STREP DNA SPEC QL NAA+PROBE: NEGATIVE
N GONORRHOEA DNA SPEC QL NAA+PROBE: NEGATIVE
T PALLIDUM AB SER QL: NONREACTIVE

## 2023-10-15 PROCEDURE — 99233 SBSQ HOSP IP/OBS HIGH 50: CPT | Performed by: NURSE PRACTITIONER

## 2023-10-15 PROCEDURE — 258N000003 HC RX IP 258 OP 636: Performed by: FAMILY MEDICINE

## 2023-10-15 PROCEDURE — 250N000011 HC RX IP 250 OP 636: Performed by: FAMILY MEDICINE

## 2023-10-15 PROCEDURE — 87591 N.GONORRHOEAE DNA AMP PROB: CPT | Performed by: FAMILY MEDICINE

## 2023-10-15 PROCEDURE — 99231 SBSQ HOSP IP/OBS SF/LOW 25: CPT | Performed by: OBSTETRICS & GYNECOLOGY

## 2023-10-15 PROCEDURE — 120N000001 HC R&B MED SURG/OB

## 2023-10-15 PROCEDURE — 87491 CHLMYD TRACH DNA AMP PROBE: CPT | Performed by: FAMILY MEDICINE

## 2023-10-15 RX ADMIN — GENTAMICIN SULFATE 130 MG: 40 INJECTION, SOLUTION INTRAMUSCULAR; INTRAVENOUS at 07:37

## 2023-10-15 RX ADMIN — CEFAZOLIN 1 G: 1 INJECTION, POWDER, FOR SOLUTION INTRAMUSCULAR; INTRAVENOUS at 06:07

## 2023-10-15 RX ADMIN — GENTAMICIN SULFATE 130 MG: 40 INJECTION, SOLUTION INTRAMUSCULAR; INTRAVENOUS at 16:16

## 2023-10-15 RX ADMIN — BETAMETHASONE SODIUM PHOSPHATE AND BETAMETHASONE ACETATE 12 MG: 3; 3 INJECTION, SUSPENSION INTRA-ARTICULAR; INTRALESIONAL; INTRAMUSCULAR at 12:40

## 2023-10-15 RX ADMIN — CEFAZOLIN 1 G: 1 INJECTION, POWDER, FOR SOLUTION INTRAMUSCULAR; INTRAVENOUS at 22:30

## 2023-10-15 RX ADMIN — CLINDAMYCIN PHOSPHATE 900 MG: 900 INJECTION, SOLUTION INTRAVENOUS at 06:51

## 2023-10-15 RX ADMIN — CEFAZOLIN 1 G: 1 INJECTION, POWDER, FOR SOLUTION INTRAMUSCULAR; INTRAVENOUS at 14:26

## 2023-10-15 RX ADMIN — CLINDAMYCIN PHOSPHATE 900 MG: 900 INJECTION, SOLUTION INTRAVENOUS at 15:36

## 2023-10-15 ASSESSMENT — ACTIVITIES OF DAILY LIVING (ADL)
ADLS_ACUITY_SCORE: 35
ADLS_ACUITY_SCORE: 20
ADLS_ACUITY_SCORE: 35
ADLS_ACUITY_SCORE: 20

## 2023-10-15 NOTE — PROGRESS NOTES
[facility] Antepartum Progress Note    Sushila Shafer MRN# [id number]   Age: 33 year old  Gestational age: 29w0d YOB: 1990       Date of Admission: 10/14/2023          Subjective:         PPROM at 28+6, now 29w0d  No contractions, good fetal movement.          Objective:          Patient Vitals for the past 24 hrs:   BP Temp Temp src Resp   10/15/23 0744 93/53 98.2  F (36.8  C) Oral 16   10/15/23 0406 93/50 97.9  F (36.6  C) Oral 16   10/15/23 0045 102/58 98.1  F (36.7  C) Oral 16   10/15/23 0040 (!) 81/49 -- -- --   10/14/23 2015 104/57 99  F (37.2  C) Oral 16   10/14/23 1646 108/59 99.1  F (37.3  C) Oral 16   10/14/23 1336 106/63 98.8  F (37.1  C) Oral 16     Temp:  [97.9  F (36.6  C)-99.1  F (37.3  C)] 98.2  F (36.8  C)  Resp:  [16] 16  BP: ()/(49-63) 93/53    Results for orders placed or performed during the hospital encounter of 10/14/23 (from the past 24 hour(s))   RH Immune Globulin Screen   Result Value Ref Range    Fetal Bleed Screen NEG Negative    SPECIMEN EXPIRATION DATE [id number]    CBC with platelets differential    Narrative    The following orders were created for panel order CBC with platelets differential.  Procedure                               Abnormality         Status                     ---------                               -----------         ------                     CBC with platelets and d...[[id number]]  Abnormal            Final result                 Please view results for these tests on the individual orders.   ABO/Rh type and screen    Narrative    The following orders were created for panel order ABO/Rh type and screen.  Procedure                               Abnormality         Status                     ---------                               -----------         ------                     Adult Type and Screen[[id number]]                            Final result                 Please view results for these tests on the individual  orders.   Creatinine   Result Value Ref Range    Creatinine 0.46 (L) 0.51 - 0.95 mg/dL    GFR Estimate >90 >60 mL/min/1.73m2   CBC with platelets and differential   Result Value Ref Range    WBC Count 15.1 (H) 4.0 - 11.0 10e3/uL    RBC Count 4.71 3.80 - 5.20 10e6/uL    Hemoglobin 15.0 11.7 - 15.7 g/dL    Hematocrit 42.6 35.0 - 47.0 %    MCV 90 78 - 100 fL    MCH 31.8 26.5 - 33.0 pg    MCHC 35.2 31.5 - 36.5 g/dL    RDW 13.1 10.0 - 15.0 %    Platelet Count 241 150 - 450 10e3/uL    % Neutrophils 90 %    % Lymphocytes 7 %    % Monocytes 2 %    Mids % (Monos, Eos, Basos)      % Eosinophils 0 %    % Basophils 0 %    % Immature Granulocytes 1 %    NRBCs per 100 WBC 0 <1 /100    Absolute Neutrophils 13.5 (H) 1.6 - 8.3 10e3/uL    Absolute Lymphocytes 1.1 0.8 - 5.3 10e3/uL    Absolute Monocytes 0.4 0.0 - 1.3 10e3/uL    Mids Abs (Monos, Eos, Basos)      Absolute Eosinophils 0.0 0.0 - 0.7 10e3/uL    Absolute Basophils 0.1 0.0 - 0.2 10e3/uL    Absolute Immature Granulocytes 0.1 <=0.4 10e3/uL    Absolute NRBCs 0.0 10e3/uL   Adult Type and Screen   Result Value Ref Range    ABO/RH(D) O NEG     Antibody Screen Negative Negative    SPECIMEN EXPIRATION DATE [id number]    Urine Drugs of Abuse Screen    Narrative    The following orders were created for panel order Urine Drugs of Abuse Screen.  Procedure                               Abnormality         Status                     ---------                               -----------         ------                     Drug Abuse Screen Qual U...[[id number]]  Normal              Final result                 Please view results for these tests on the individual orders.   Drug Abuse Screen Qual Urine   Result Value Ref Range    Amphetamines Urine Screen Negative Screen Negative    Barbituates Urine Screen Negative Screen Negative    Benzodiazepine Urine Screen Negative Screen Negative    Cannabinoids Urine Screen Negative Screen Negative    Cocaine Urine Screen Negative Screen Negative     "Fentanyl Qual Urine Screen Negative Screen Negative    Opiates Urine Screen Negative Screen Negative    PCP Urine Screen Negative Screen Negative   Treponema Abs w Reflex to RPR and Titer   Result Value Ref Range    Treponema Antibody Total Nonreactive Nonreactive   Wet prep    Specimen: Vagina; Swab   Result Value Ref Range    Trichomonas Absent Absent    Yeast Absent Absent    Clue Cells Absent Absent    WBCs/high power field None None   Glucose tolerance gest screen 1 hour   Result Value Ref Range    Glu Gest Screen 1hr 50g 228 (H) 70 - 129 mg/dL    Narrative    This is a screening test for Gestational Diabetes Mellitus.   If results are 130 mg/dL or greater, a Standard 100 gram Gestational  3 hour Glucose Tolerance should be performed.   Glucose by meter   Result Value Ref Range    GLUCOSE BY METER POCT 110 (H) 70 - 99 mg/dL       Gen: Well-appearing, NAD  Abdomen: Gravid, Soft, Non-tender        LABS (Last ten results)   CBC  Recent Labs   Lab 10/14/23  1405   WBC 15.1*   HGB 15.0   HCT 42.6           Recent Labs   Lab 10/14/23  1405   CR 0.46*      COAGSNo lab results found in last 7 days.    Invalid input(s): \"FIBRINOGEN\"              Assessment/Plan:          33 year old y.o.  at 29w0d admitted for PPROM         1) PPROM: Status post betamethasone on 10/14, second dose today.    Ancef 1 gm q 8 x 48 hours, then Keflex 500 mg po 4 x daily x 5 days and discuss zithromax alternative with pharmacy, possible erythromycin vs clindamycin (which has been used as alternative in the [place] for zithromax allergy). Pharmacy thinks erythromycin will cause a rash so they recommend clindamycin.  Pharmacy also recommends adding gentamicin to ensure good coverage of pathogens. These orders are placed.     If laboring prior to 32 weeks: magnesium for neuroprophylaxis.     2) FWB: A few variable decels and one episode of a prolonged decel overnight. Currently category 1. Continuous monitoring x 24-48 hours then TBD. " NICU consultation today. MFM US/BPP for Tuesday this week is recommended, then twice weekly Tues/Fri.    3) Maternal well being/routine prenatal care: Comfort cares for mom   Covid/flu/tdap vaccinations discussed. She would like to space this and start tomorrow.  Rhogam 10/14  Unfortunately the one hr GTT was administered after beta, so is invalid. Plan to check fasting and 1 hr postprandials for a couple of days, if nothing is far out of range, assume from beta and then stop checking. Repeat one hr at least 10 days from last betamethasone.  Discussed RSV vaccine if still pregnant at 32 weeks.        Usha Guardado MD  [facility]  [facility]

## 2023-10-15 NOTE — PLAN OF CARE
Vitals stable, afebrile. Pt was able to rest well intermittently overnight. Pt is lionel occasionally, denies feeling contractions. FHR primarily category 1, however intermittent variable decels present and prolonged decel x1 lasting 3 minutes which resolved spontaneously. Fasting BG this morning 110.

## 2023-10-15 NOTE — PROVIDER NOTIFICATION
10/14/23 5498   Provider Notification   Provider Name/Title Dr. Quezada   Method of Notification In Department   Notification Reason Lab/Diagnostic Study     Reviewed 1hr gtt.  VORB to check OT QID (FSBG, 1hr postprandial x3) for the next few days.

## 2023-10-15 NOTE — CONSULTS
_                            Neonatology Advanced Practice Antepartum Counseling Consult      I was asked to provide antepartum counseling for Sushila Shafer at the request of Usha Guardado MD secondary to PPROM. Ms. Shafer is currently 29 weeks and has a hx significant for Premature rupture of membrane. Betamethasone was administered on 10/14 &10/15. Ms. Shafer, accompanied by her , was counseled on the expected hospital course, potential risks, and outcomes associated with an infant born at approximately 29 weeks gestation. The counseling included: morbidity, mortality, initial delivery room stabilization, respiratory course, lung development, RDS, patent ductus arteriosus, retinopathy of prematurity, hyperbilirubinemia, hemodynamic support, infection, intraventricular hemorrhage, nutrition, growth and development, and long term outcomes. Please feel free to call with any additional questions or concerns.          Floor Time (min): 10  Face to Face Time (min): 40  Total Time (minutes): 50  More than 50% of my time was spent in direct, face to face, antepartum counseling with the above patient.    SOFY Miller CNP    Advanced Practice Service    Intensive Care Unit  [facility]

## 2023-10-15 NOTE — PROVIDER NOTIFICATION
10/15/23 1032   Provider Notification   Provider Name/Title Dr Guardado   Method of Notification At Bedside   Request Evaluate in Person   Notification Reason Other (Comment)     Dr Guardado in dept updated reg her meds and pts is requesting to take her own homes meds,VSS,no conts and FHT Variable decle noted periodically, pt received 1st dose of BMZ and GCT was done after that,BS are high.MD at BS and talked to reg what to expect, and treatment plans,will send her Home meds to Pharmacy for approval and Label.Continue watch and monitor.

## 2023-10-15 NOTE — PROVIDER NOTIFICATION
10/15/23 0219   Provider Notification   Provider Name/Title Dr. Quezada   Method of Notification Phone     Notified Dr. Quezada of continued intermittent variable decelerations, overall moderate variability and accelerations present.  Intermittent contractions.  No new orders at this time.    Notified Dr. Quezada that call was received from lab that gc/chlamydia will need to be cancelled because it was collected incorrectly.  TORB for urine gc/chlamydia.

## 2023-10-15 NOTE — CARE PLAN
Data: Afebrile. Leaking small amounts of clear fluid. Contraction pattern  none . Fetal assessment Appropriate for Gestational Age.periodic decel noted  Signs and symptoms of infection none.  Pt is requesting to take her own home meds that is Probiotics,Fish oil, calcium and Prenatal vitamins.Writer talked to Pharmacist  Heidi rainey pts request.   Pt is requesting to have her Flu,Tdap and COVID vaccines from tomorrow and  would like space these.  Interventions: Monitor vital signs and indicators of infection every 4 hours while awake. Continue uterine/fetal assessment continues. Activity level: Bed rest with bathroom privileges. Preventive measures include Medications, Positioning, and Frequent voiding. Encourage active range of motion and frequent position changes.  Checking BS fasting and one hour post meals.  Plan: Continue expectant management. Observe for and notify care provider of indicators of progressing labor, signs/symptoms of infection, or fetal/maternal compromise.  Please do not call OB Dr if BS are in expectable ranges.  Give vaccine from tomorrow and give one a day.  Pt's  will bring all meds tonight and send to Pharmacy for label and approval.

## 2023-10-16 ENCOUNTER — APPOINTMENT (OUTPATIENT)
Dept: ULTRASOUND IMAGING | Facility: CLINIC | Age: 33
End: 2023-10-16
Attending: OBSTETRICS & GYNECOLOGY
Payer: COMMERCIAL

## 2023-10-16 LAB
GLUCOSE BLDC GLUCOMTR-MCNC: 101 MG/DL (ref 70–99)
GLUCOSE BLDC GLUCOMTR-MCNC: 107 MG/DL (ref 70–99)
GLUCOSE BLDC GLUCOMTR-MCNC: 113 MG/DL (ref 70–99)
GLUCOSE BLDC GLUCOMTR-MCNC: 117 MG/DL (ref 70–99)

## 2023-10-16 PROCEDURE — 99231 SBSQ HOSP IP/OBS SF/LOW 25: CPT | Performed by: OBSTETRICS & GYNECOLOGY

## 2023-10-16 PROCEDURE — 250N000011 HC RX IP 250 OP 636: Mod: JZ | Performed by: FAMILY MEDICINE

## 2023-10-16 PROCEDURE — 99222 1ST HOSP IP/OBS MODERATE 55: CPT | Mod: 25 | Performed by: OBSTETRICS & GYNECOLOGY

## 2023-10-16 PROCEDURE — 258N000003 HC RX IP 258 OP 636: Performed by: FAMILY MEDICINE

## 2023-10-16 PROCEDURE — 76819 FETAL BIOPHYS PROFIL W/O NST: CPT

## 2023-10-16 PROCEDURE — 120N000001 HC R&B MED SURG/OB

## 2023-10-16 PROCEDURE — 250N000013 HC RX MED GY IP 250 OP 250 PS 637: Performed by: FAMILY MEDICINE

## 2023-10-16 PROCEDURE — 76816 OB US FOLLOW-UP PER FETUS: CPT

## 2023-10-16 PROCEDURE — 90471 IMMUNIZATION ADMIN: CPT | Performed by: FAMILY MEDICINE

## 2023-10-16 PROCEDURE — 90715 TDAP VACCINE 7 YRS/> IM: CPT | Performed by: FAMILY MEDICINE

## 2023-10-16 PROCEDURE — 76816 OB US FOLLOW-UP PER FETUS: CPT | Mod: 26 | Performed by: OBSTETRICS & GYNECOLOGY

## 2023-10-16 PROCEDURE — 76819 FETAL BIOPHYS PROFIL W/O NST: CPT | Mod: 26 | Performed by: OBSTETRICS & GYNECOLOGY

## 2023-10-16 RX ORDER — LACTOBACILLUS RHAMNOSUS GG 10B CELL
1 CAPSULE ORAL DAILY
Status: DISCONTINUED | OUTPATIENT
Start: 2023-10-17 | End: 2023-10-16

## 2023-10-16 RX ORDER — ASCORBIC ACID 500 MG
1650 TABLET ORAL DAILY
Status: DISCONTINUED | OUTPATIENT
Start: 2023-10-17 | End: 2023-10-20

## 2023-10-16 RX ORDER — AMOXICILLIN 250 MG
1 CAPSULE ORAL
Status: DISCONTINUED | OUTPATIENT
Start: 2023-10-16 | End: 2023-10-30 | Stop reason: HOSPADM

## 2023-10-16 RX ORDER — CHLORAL HYDRATE 500 MG
1 CAPSULE ORAL DAILY
Status: DISCONTINUED | OUTPATIENT
Start: 2023-10-17 | End: 2023-10-16

## 2023-10-16 RX ORDER — L.ACID/L.CASEI/B.BIF/B.LON/FOS 2B CELL-50
1 CAPSULE ORAL DAILY
Status: DISCONTINUED | OUTPATIENT
Start: 2023-10-17 | End: 2023-10-20

## 2023-10-16 RX ORDER — LANOLIN ALCOHOL/MO/W.PET/CERES
2 CREAM (GRAM) TOPICAL DAILY
Status: DISCONTINUED | OUTPATIENT
Start: 2023-10-17 | End: 2023-10-16

## 2023-10-16 RX ORDER — PRENATAL VIT/IRON FUM/FOLIC AC 27MG-0.8MG
2 TABLET ORAL DAILY
Status: DISCONTINUED | OUTPATIENT
Start: 2023-10-17 | End: 2023-10-16

## 2023-10-16 RX ORDER — CEPHALEXIN 250 MG/5ML
500 POWDER, FOR SUSPENSION ORAL EVERY 6 HOURS
Status: COMPLETED | OUTPATIENT
Start: 2023-10-16 | End: 2023-10-21

## 2023-10-16 RX ORDER — AMOXICILLIN 250 MG
2 CAPSULE ORAL
Status: DISCONTINUED | OUTPATIENT
Start: 2023-10-16 | End: 2023-10-30 | Stop reason: HOSPADM

## 2023-10-16 RX ORDER — CLINDAMYCIN PALMITATE HYDROCHLORIDE 75 MG/5ML
450 SOLUTION ORAL 3 TIMES DAILY
Status: COMPLETED | OUTPATIENT
Start: 2023-10-16 | End: 2023-10-21

## 2023-10-16 RX ORDER — D-METHORPHAN/PE/ACETAMINOPHEN 10-5-325MG
2 CAPSULE ORAL DAILY
Status: DISCONTINUED | OUTPATIENT
Start: 2023-10-16 | End: 2023-10-20

## 2023-10-16 RX ADMIN — CLINDAMYCIN PALMITATE HYDROCHLORIDE (PEDIATRIC) 450 MG: 75 SOLUTION ORAL at 16:35

## 2023-10-16 RX ADMIN — PANTOPRAZOLE SODIUM 40 MG: 40 TABLET, DELAYED RELEASE ORAL at 08:50

## 2023-10-16 RX ADMIN — GENTAMICIN SULFATE 130 MG: 40 INJECTION, SOLUTION INTRAMUSCULAR; INTRAVENOUS at 01:22

## 2023-10-16 RX ADMIN — CEPHALEXIN 500 MG: 250 FOR SUSPENSION ORAL at 12:49

## 2023-10-16 RX ADMIN — CLINDAMYCIN PHOSPHATE 900 MG: 900 INJECTION, SOLUTION INTRAVENOUS at 08:19

## 2023-10-16 RX ADMIN — GENTAMICIN SULFATE 130 MG: 40 INJECTION, SOLUTION INTRAMUSCULAR; INTRAVENOUS at 08:24

## 2023-10-16 RX ADMIN — CEFAZOLIN 1 G: 1 INJECTION, POWDER, FOR SOLUTION INTRAMUSCULAR; INTRAVENOUS at 06:36

## 2023-10-16 RX ADMIN — CLINDAMYCIN PHOSPHATE 900 MG: 900 INJECTION, SOLUTION INTRAVENOUS at 00:15

## 2023-10-16 RX ADMIN — CEPHALEXIN 500 MG: 250 FOR SUSPENSION ORAL at 18:20

## 2023-10-16 RX ADMIN — CLOSTRIDIUM TETANI TOXOID ANTIGEN (FORMALDEHYDE INACTIVATED), CORYNEBACTERIUM DIPHTHERIAE TOXOID ANTIGEN (FORMALDEHYDE INACTIVATED), BORDETELLA PERTUSSIS TOXOID ANTIGEN (GLUTARALDEHYDE INACTIVATED), BORDETELLA PERTUSSIS FILAMENTOUS HEMAGGLUTININ ANTIGEN (FORMALDEHYDE INACTIVATED), BORDETELLA PERTUSSIS PERTACTIN ANTIGEN, AND BORDETELLA PERTUSSIS FIMBRIAE 2/3 ANTIGEN 0.5 ML: 5; 2; 2.5; 5; 3; 5 INJECTION, SUSPENSION INTRAMUSCULAR at 15:30

## 2023-10-16 RX ADMIN — CLINDAMYCIN PALMITATE HYDROCHLORIDE (PEDIATRIC) 450 MG: 75 SOLUTION ORAL at 22:44

## 2023-10-16 ASSESSMENT — ACTIVITIES OF DAILY LIVING (ADL)
ADLS_ACUITY_SCORE: 20

## 2023-10-16 NOTE — PROVIDER NOTIFICATION
10/15/23 2030   Provider Notification   Provider Name/Title Dr. Guardado   Method of Notification In Department   Request Evaluate in Person     Dr. Guardado in department, FHR tracing reviewed, majority category 1 for the past 12 hours with no significant decels. Patient afebrile throughout shift, all VSS. IV antibiotics and postprandial blood sugar checks continued per schedule. NNP consult and full course of betamethasone complete. SCDs in place while patient is at rest.     Order received to discontinue continuous FHR tracing at this time and transition to TID NSTs beginning tomorrow at 0700. Patient aware of indications to update RN for increased FHR/uterine monitoring, including cramping, back pain, vaginal bleeding, changes in fetal movement. RN will update MD with any concerns.

## 2023-10-16 NOTE — PROGRESS NOTES
Antepartum progress note    S:  Sushila Shafer is a 33 year old female  Estimated Date of Delivery: Dec 31, 2023 blood group O Rh- antibody screen negative urine tox screen negative admitted to labor and delivery at 28-6/7 weeks gestation for  premature rupture of membranes.  Patient is hospital day 3.  Patient is presently doing well without concerns.  She denies contractions or symptoms of  labor or infection.  Good fetal movement    O:  /57   Temp 98.2  F (36.8  C) (Oral)   Resp 16   LMP 2023   Heart rate tracing category 1.  Biophysical profile this morning 8 out of 8    Constitutional: healthy, alert, and no distress  Cardiovascular: negative, PMI normal. No lifts, heaves, or thrills. RRR. No murmurs, clicks gallops or rub  Respiratory: negative, Percussion normal. Good diaphragmatic excursion. Lungs clear  Gastrointestinal: Abdomen soft, non-tender. BS normal. No masses, gravid uterus consistent with dates    A/P: Intrauterine pregnancy 29-1/7 weeks gestation,  premature rupture of membranes.  Patient had an elevated 1 hour blood sugar screen but has been given betamethasone.  Repeat blood sugar this morning was 100    1). PPROM has received 2 doses of betamethasone on 10/14 and 10/15  Has completed Ancef 1 g every 8 for 48 hours.  Is now being switched to oral Keflex 500 mg p.o. 4 times daily for a total of 5 days and oral clindamycin.  Patient also has completed a course of IV gentamicin.    2).  Consult has been done with  ICU.  Fetal heart rate tracing currently category 1.  Twice weekly biophysical profiles are scheduled for  and .  Examination for 3 times daily NSTs    3).  Patient has some intermittent left ear discomfort that is been off and on for the last several weeks.  She had an episode last evening that lasted approximately an hour and a half and is now resolved.  No hearing loss.  We will consider looking with otoscope.   Patient is asymptomatic at present.    4).  If laboring prior to 32 weeks we will consider magnesium for neuro prophylaxis    Plan reviewed with the patient.  Her questions have been answered

## 2023-10-16 NOTE — PROVIDER NOTIFICATION
10/16/23 0920   Provider Notification   Provider Name/Title DR Bustillo   Method of Notification In Department;At Bedside   Request Evaluate in Person   Notification Reason Status Update;Other (Comment)     Dr Bustillo in dept updated reg her current status day 2 admission,no cramps still continues LOF yellow color her VSS,IV Antibiotics done and will switch to PO for next doses; pt is requesting for Liquid form as its hard for her swallow.BPP is done instead of tomorrow as there was opening;it is 8/8.Pt reported hearing problem loss briefly, pt further mentioned she had this issue prior to this admission.NST TID now.Dr Bustillo at  talked to pt POC is same, will continue watch and monitor.Writer will send message to Pharmacist for liquid Antibiotics.Pt has no concerns at this time.

## 2023-10-16 NOTE — PROGRESS NOTES
Stable tracing all day; discussed ok to come off continuous monitoring and resume with NST in the morning. If remains stable, plan tid NST with twice weekly BPPs.    Usha Guardado MD  [facility]

## 2023-10-16 NOTE — PLAN OF CARE
"Pt resting in bed. Eyes closed between cares. Woken for fasting BG and IV abx. Pt complains of severe pain \"like child birth\" with slow IVP; however, no redness or swelling. Okay with slow IV infusion and heat pack. Will continue to closely monitor. Report given to Rozina, RN, who assumes cares. Litzy Castle RN on 10/16/2023 at 7:22 AM    "

## 2023-10-16 NOTE — DISCHARGE INSTRUCTIONS
Follow up in 6 weeks   If incision has pain or discharge please call prior to this     If experiencing any post partum depression, crying, feeling down please make earlier appointment   To discuss symptoms    Call [phone] for appointment     Call and ask to be seen or talk to a doctor for the following: (You can go to the ob triage button   On answering service line) .     Increased bleeding, fever, general unwell feeling or increased pain.     Dr. Kaylah Quezada, DO    OB/GYN   [facility]      Postpartum Vaginal Delivery Instructions    Activity     Ask family and friends for help when you need it.  Do not place anything in your vagina for 6 weeks.  You are not restricted on other activities, but take it easy for a few weeks to allow your body to recover from delivery.  You are able to do any activities you feel up to that point.  No driving until you have stopped taking your pain medications (usually two weeks after delivery).     Call your health care provider if you have any of these symptoms:     Increased pain, swelling, redness, or fluid around your stiches from an episiotomy or perineal tear.  A fever above 100.4 F (38 C) with or without chills when placing a thermometer under your tongue.  You soak a sanitary pad with blood within 1 hour, or you see blood clots larger than a golf ball.  Bleeding that lasts more than 6 weeks.  Vaginal discharge that smells bad.  Severe pain, cramping or tenderness in your lower belly area.  A need to urinate more frequently (use the toilet more often), more urgently (use the toilet very quickly), or it burns when you urinate.  Nausea and vomiting.  Redness, swelling or pain around a vein in your leg.  Problems breastfeeding or a red or painful area on your breast.  Chest pain and cough or are gasping for air.  Problems coping with sadness, anxiety, or depression.  If you have any concerns about hurting yourself or the baby, call  your provider immediately.   You have questions or concerns after you return home.     Keep your hands clean:  Always wash your hands before touching your perineal area and stitches.  This helps reduce your risk of infection.  If your hands aren't dirty, you may use an alcohol hand-rub to clean your hands. Keep your nails clean and short.

## 2023-10-16 NOTE — CARE PLAN
Data: Afebrile. Leaking small amounts of yellow color fluid. Contraction pattern  not reported by pt and not noticed during monitoring,on Palpation Abdomen is soft . Fetal assessment Appropriate for Gestational Age. Signs and symptoms of infection none  VSS stable.  Pt requesting to take her Home meds(Prenatal vitamins,Probiotics calcium and Fish oil)  Interventions: Monitor vital signs and indicators of infection every 4 hours while awake. Continue uterine/fetal assessment NST TID. Activity level: Bed rest with bathroom privileges. Preventive measures include Medications, Positioning, and Frequent voiding. Encourage active range of motion and frequent position changes.  Home meds send to Pharmacy for labels.  Plan: Continue expectant management. Observe for and notify care provider of indicators of progressing labor, signs/symptoms of infection, or fetal/maternal compromise.  Orders for home meds needs to be placed.

## 2023-10-16 NOTE — PROVIDER NOTIFICATION
10/16/23 1025   Provider Notification   Provider Name/Title [facility] Pharmcist   Method of Notification Phone   Request Evaluate - Remote   Notification Reason Patient Request     Writer talked to Pharmacist that Pt is requesting liquid Antibiotics instead of tablet/capsule.He said he will send those meds.

## 2023-10-16 NOTE — CONSULTS
Maternal-Fetal Medicine Consultation     Sushila Shafer MRN# [id number]   YOB: 1990 Age: 33 year old   Date of Admission: 10/14/2023     Reason for consult: I was asked by Dr. Zepeda to evaluate this patient for PPROM.           Assessment and Plan:   Sushila Shafer is a  at 29w1d admitted with  prelabor rupture of membranes (PPROM).  We reviewed that the gestational age at initial membrane rupture and the time interval between membrane rupture and delivery are probably the most important factors in determining outcome. We reviewed that in pregnancies complicated by PPROM there is a higher risk of adverse  outcome due to prematurity and infection.  It was reviewed that should the patient develop an infection, immediate delivery is recommended.         survival is related to gestational age at delivery, and is comparable to that in  deliveries matched for gestational age without PPROM.  Sushila has met with the NICU for consultation due to risk for  birth.  Pregnancy complications associated with PPROM include increased risks of infection, placental abruption,  birth, and need for  delivery.  The most significant risks to the fetus with PPROM are due to prematurity with the most common complication being respiratory distress, with lesser risks including cord prolapse and rarely fetal loss during expectant management.      We discussed the plan for inpatient management until delivery which would be a goal of 34 weeks. Interventions that help prolong latency and improve  outcomes include latency antibiotics for one week (which Sushila is already receiving), administration of  corticosteroids (betamethasone, which Sushila has already received), use of magnesium sulfate for neuroprophylaxis prior to 32 weeks. If she goes into  labor, tocolytics will not be administered given concern for possible subclinical  infection.     Traditionally, the competing risks of fetal prematurity and maternal infection favor preventing prematurity prior to 34 weeks and preventing maternal infection after 34 weeks. For this reason we delivery is typically recommended in the setting of PPROM at 34 weeks. We reviewed data from a randomized trial of [id number] patients evaluated immediate delivery versus expectant management in patients with PROM between 34 & 0/7 and 36 & 6/7 weeks.  There was no significant difference in  sepsis or in composite  morbidity. Infants in the immediate delivery group had higher rates of respiratory distress and mechanical ventilation and spent more days in intensive care (4 versus 2). However, maternal hemorrhage and infection were approximately twofold higher with expectant management, although the rate of  birth was lower. There is less data regarding expectant management beyond 34 & 0/7 weeks in patients who present prior to that gestational age. We reviewed that in light of this data shared decision making between the patient and her providers and individualized decision making regarding timing of delivery is appropriate. If expectant management is chosen, it should include careful inpatient monitoring of symptoms and signs of maternal infection, chorioamnionitis, and antepartum hemorrhage. Either expectant management or immediate delivery in patients with PROM between 34 0/7 and 36 6/7 weeks of is reasonable, although the balance of both maternal and  benefit, should be carefully considered and discussed with the patient. Care should be individualized through shared decision making.  We planned that this could be re-addressed at 34 weeks if [facility] were considering continued expectant management at that time.     Recommendations:  Speculum exam should be performed as clinically indicated to assess cervical dilation, digital exam should be avoided unless actively laboring   Continue  modified latency antibiotics (due to Sushila's allergies) for a total of 7 days as currently ordered   corticosteroid course can be repeated if the patient's first course is administered more than a week prior and there is more imminent risk for delivery   Use of magnesium sulfate for neuroprophylaxis if risk for delivery prior to 32 weeks per hospital protocol: 6 gm bolus followed by 2 gm per hour  We would not recommend tocolysis in the setting of PPROM   Fetal monitoring is recommended with EFM x 1 hour 3 times a day or more frequently if clinical change in the patient's status in addition to assessment of fetal wellbeing and amniotic fluid volume with BPPs 2 x a week.   Hospitalization until delivery; timing of delivery - 34w0d (or later if patient and partner decide after shared decision making)  Contraindications for expectant management include:  Clinical concern for chorioamnionitis or suspected intrauterine infection and inflammation.   Active labor  Nonreassuring fetal status  Placental abruption with significant maternal bleeding  If immediate delivery is indicated due to concern for chorioamnionitis, administration of broad spectrum antibiotics is recommended (Ancef due to penicillin allergy and gentamycin until delivery with addition of clindamycin if the patient requires ). Treatment for 24 hours postoperative is recommended post- assuming the patient remains afebrile  PPROM is not an indication for  per se and mode of delivery is per obstetric indication and should consider both maternal and fetal status at time of delivery  Recommend otoscopic examination given reported alteration in hearing.    Given anticipated hospitalization > 72 hours, if Sushila remains stable through tomorrow, consider initiation of Lovenox 40 mg subcutaneous daily for DVT prophylaxis.  Sushila was concerned about starting this, as we reviewed she will need to be off Lovenox for 12 hours before  she would be a candidate for regional anesthesia.  While she considers this, continue SCD's for mechanical thromboprophylaxis.  Lovenox should be held if any changes in clinical status that may necessitate delivery.  Continue QID fingerstick glucose x 1 week,.  If the majority are increased above goal (> 95 fasting, > 140 1 hour PP), then recommend initiation of insulin.      Juli Vance MD    60 MINUTES SPENT BY ME on the date of service doing chart review, history, exam, documentation & further activities per the note.                     Chief Complaint:   Prelabor premature rupture of membranes    History is obtained from the patient         History of Present Illness:   This patient is a 33 year old female  currently at 29w1d who presents with leakage of clear fluid per vagina on the morning of 10/14/2023 (at 28w6d).  She was found to be grossly ruptured upon presentation, and PPROM was diagnosed by clinical assessment and positive fern.    Sushila states she had been in her normal state of health with PPROM occurred, with the exception of feeling like her hearing was muffled for approximately 3 weeks prior to admission.  This sensation comes and goes, and lasted for several hours this weekend, now resolved.  She denies any URTI symptoms, denies dizziness or vertigo.  Regarding PPROM, she denies any recent abdominal trauma, vaginal bleeding or contractions.  She states she had some slight bleeding when PPROM occurred but no bleeding since then.  She denies any contractions, abdominal pain, fever or chills.  She states the baby has been active.  She is adjusting to her inpatient stay.                  Past Medical History:     Past Medical History:   Diagnosis Date    Ocular migraine              Past Surgical History:     Past Surgical History:   Procedure Laterality Date    MAMMOPLASTY AUGMENTATION              Social History:     Social History     Tobacco Use    Smoking status: Never     Smokeless tobacco: Never   Substance Use Topics    Alcohol use: Not Currently             Allergies:     Allergies   Allergen Reactions    Azithromycin Hives, Itching and Rash     Other reaction(s): Itching, Pruritus      Lactose Other (See Comments)    Penicillins Rash             Medications:     Medications Prior to Admission   Medication Sig Dispense Refill Last Dose    Prenatal Vit-Fe Fumarate-FA (PNV PRENATAL PLUS MULTIVITAMIN) 27-1 MG TABS per tablet Take 1 tablet by mouth daily   10/13/2023             Review of Systems:     The 10 point Review of Systems is negative other than noted in the HPI           Physical Exam:   Vitals were reviewed  Patient Vitals for the past 72 hrs:   BP Temp Temp src Resp   10/16/23 1306 94/51 98.7  F (37.1  C) Oral 16   10/16/23 0849 101/57 -- -- 16   10/16/23 0730 -- 98.2  F (36.8  C) Oral --   10/16/23 0242 -- 98  F (36.7  C) Oral --   10/16/23 0019 101/59 98  F (36.7  C) Oral 16   10/15/23 2043 103/63 98.2  F (36.8  C) -- 17   10/15/23 1500 97/63 97.8  F (36.6  C) Oral 16   10/15/23 1230 -- 98.1  F (36.7  C) Oral --   10/15/23 0744 93/53 98.2  F (36.8  C) Oral 16   10/15/23 0406 93/50 97.9  F (36.6  C) Oral 16   10/15/23 0045 102/58 98.1  F (36.7  C) Oral 16   10/15/23 0040 (!) 81/49 -- -- --   10/14/23 2015 104/57 99  F (37.2  C) Oral 16   10/14/23 1646 108/59 99.1  F (37.3  C) Oral 16   10/14/23 1336 106/63 98.8  F (37.1  C) Oral 16   10/14/23 1015 -- 98.5  F (36.9  C) Oral --   10/14/23 0817 121/74 98.7  F (37.1  C) Oral 16     Constitutional:   awake, alert, cooperative, no apparent distress, and appears stated age   Abdomen:   Gravid, soft, NT, no palpable contractions   Musculoskeletal:   no lower extremity pitting edema present               Data:     Component      Latest Ref Rng 10/14/2023  8:44 AM 10/14/2023  9:02 AM 10/14/2023  2:05 PM   WBC      4.0 - 11.0 10e3/uL   15.1 (H)    RBC Count      3.80 - 5.20 10e6/uL   4.71    Hemoglobin      11.7 - 15.7 g/dL   15.0     Hematocrit      35.0 - 47.0 %   42.6    MCV      78 - 100 fL   90    MCH      26.5 - 33.0 pg   31.8    MCHC      31.5 - 36.5 g/dL   35.2    RDW      10.0 - 15.0 %   13.1    Platelet Count      150 - 450 10e3/uL   241    % Neutrophils      %   90    % Lymphocytes      %   7    % Monocytes      %   2    % Eosinophils      %   0    % Basophils      %   0    % Immature Granulocytes      %   1    NRBCs per 100 WBC      <1 /100   0    Absolute Neutrophils      1.6 - 8.3 10e3/uL   13.5 (H)    Absolute Lymphocytes      0.8 - 5.3 10e3/uL   1.1    Absolute Monocytes      0.0 - 1.3 10e3/uL   0.4    Absolute Eosinophils      0.0 - 0.7 10e3/uL   0.0    Absolute Basophils      0.0 - 0.2 10e3/uL   0.1    Absolute Immature Granulocytes      <=0.4 10e3/uL   0.1    Absolute NRBCs      10e3/uL   0.0    Amphetamine Qual Urine      Screen Negative       Barbiturates Qual Urine      Screen Negative       Benzodiazepine Urine      Screen Negative       Cannabinoids Qual Urine      Screen Negative       Cocaine Urine      Screen Negative       Fentanyl Qual Urine      Screen Negative       Opiates Qualitative Urine      Screen Negative       PCP Urine      Screen Negative       Trichomonas      Absent       Yeast      Absent       Clue cells      Absent       WBCs/high power field      None       ABO/Rh(D)   O NEG    Antibody Screen      Negative    Negative    SPECIMEN EXPIRATION DATE   [id number]    SPECIMEN EXPIRATION DATE   [id number]    Fetal Blood Screen      Negative    NEG    Creatinine      0.51 - 0.95 mg/dL   0.46 (L)    GFR Estimate      >60 mL/min/1.73m2   >90    Fern Crystallization      No ferning present  Ferning present !      Group B Strep PCR      Negative   Negative     Treponema Antibodies      Nonreactive         Component      Latest Ref Rng 10/14/2023  2:08 PM 10/14/2023  2:29 PM 10/14/2023  2:58 PM   WBC      4.0 - 11.0 10e3/uL      RBC Count      3.80 - 5.20 10e6/uL      Hemoglobin      11.7 - 15.7 g/dL       Hematocrit      35.0 - 47.0 %      MCV      78 - 100 fL      MCH      26.5 - 33.0 pg      MCHC      31.5 - 36.5 g/dL      RDW      10.0 - 15.0 %      Platelet Count      150 - 450 10e3/uL      % Neutrophils      %      % Lymphocytes      %      % Monocytes      %      % Eosinophils      %      % Basophils      %      % Immature Granulocytes      %      NRBCs per 100 WBC      <1 /100      Absolute Neutrophils      1.6 - 8.3 10e3/uL      Absolute Lymphocytes      0.8 - 5.3 10e3/uL      Absolute Monocytes      0.0 - 1.3 10e3/uL      Absolute Eosinophils      0.0 - 0.7 10e3/uL      Absolute Basophils      0.0 - 0.2 10e3/uL      Absolute Immature Granulocytes      <=0.4 10e3/uL      Absolute NRBCs      10e3/uL      Amphetamine Qual Urine      Screen Negative  Screen Negative      Barbiturates Qual Urine      Screen Negative  Screen Negative      Benzodiazepine Urine      Screen Negative  Screen Negative      Cannabinoids Qual Urine      Screen Negative  Screen Negative      Cocaine Urine      Screen Negative  Screen Negative      Fentanyl Qual Urine      Screen Negative  Screen Negative      Opiates Qualitative Urine      Screen Negative  Screen Negative      PCP Urine      Screen Negative  Screen Negative      Trichomonas      Absent    Absent    Yeast      Absent    Absent    Clue cells      Absent    Absent    WBCs/high power field      None    None    ABO/Rh(D)      Antibody Screen      Negative       SPECIMEN EXPIRATION DATE      Fetal Blood Screen      Negative       Creatinine      0.51 - 0.95 mg/dL      GFR Estimate      >60 mL/min/1.73m2      Fern Crystallization      No ferning present       Group B Strep PCR      Negative       Treponema Antibodies      Nonreactive   Nonreactive        Legend:  ! Abnormal  (H) High  (L) Low    Component      Latest Ref Rng 10/16/2023  6:53 AM 10/16/2023  11:45 AM 10/16/2023  3:36 PM   GLUCOSE BY METER POCT      70 - 99 mg/dL 101 (H)  113 (H)  117 (H)       Legend:  (H)  High    Recent Results (from the past 24 hour(s))   Maternal Fetal US Comprehensive Single F/U    Narrative            Comp Follow Up  ---------------------------------------------------------------------------------------------------------  Pat. Name: RAZIA WILLIAMSON       Study Date:  10/16/2023 8:50am  Pat. NO:  [id number]        Referring  MD: ELIAS CARRASCO  Site:  [facility]       Sonographer: Rancho Chun RDMS   :  1990        Age:   33  ---------------------------------------------------------------------------------------------------------    INDICATION  ---------------------------------------------------------------------------------------------------------  Circumvallate placenta. Reevaluate fetal growth.      METHOD  ---------------------------------------------------------------------------------------------------------  Transabdominal ultrasound examination. View: Suboptimal view: limited by fetal position      PREGNANCY  ---------------------------------------------------------------------------------------------------------  Plata pregnancy. Number of fetuses: 1      DATING  ---------------------------------------------------------------------------------------------------------                                           Date                                Details                                                                                      Gest. age                      SHONDA  LMP                                  3/26/2023                                                                                                                         29 w + 1 d                     2023  Prior assessment               2023                         GA: 8 w + 4 d                                                                            29 w + 1 d                     2023  U/S                                   10/16/2023                       based upon AC, BPD, Femur, HC                                                 29 w + 0 d                     1/1/2024  Assigned dating                  Dating performed on 08/11/2023, based on the LMP                                                             29 w + 1 d                     12/31/2023      GENERAL EVALUATION  ---------------------------------------------------------------------------------------------------------  Cardiac activity present.  bpm.  Fetal movements present.  Presentation cephalic.  Placenta anterior, no previa > 2 cm from internal os. Circumvallate placenta.  Umbilical cord 3 vessel cord.  Amniotic fluid Amount of AF: normal. MVP 3.0 cm.      FETAL BIOMETRY  ---------------------------------------------------------------------------------------------------------  Main Fetal Biometry:  BPD                                        74.1                    mm                         29w 5d                Hadlock  OFD                                        94.2                    mm                         27w 6d                Nicolaides  HC                                          267.2                  mm                          29w 1d                Hadlock  Cerebellum tr                            36.9                   mm                          31w 6d                Nicolaides  AC                                          246.8                  mm                          28w 6d        36%        Hadlock  Femur                                      53.0                   mm                          28w 1d                Hadlock  Fetal Weight Calculation:  EFW                                       1,276                  g                                     24%        Hadlock  EFW (lb,oz)                             2 lb 13                oz  EFW by                                        Hadlock (BPD-HC-AC-FL)  Head / Face / Neck Biometry:                                             5.0                     mm  CM                                           5.9                     mm      FETAL ANATOMY  ---------------------------------------------------------------------------------------------------------  The following structures appear normal:  Head / Neck                         Cranium. Head size. Head shape. Lateral ventricles. Midline falx. Cavum septi pellucidi. Cerebellum. Cisterna magna. Thalami.  Heart / Thorax                      LVOT view. 3-vessel-trachea view.                                             Diaphragm.  Abdomen                             Stomach. Kidneys. Bladder.  Spine                                  Cervical spine. Thoracic spine. Lumbar spine. Sacral spine.    The following structures were documented previously:  Face                                   Lips. Profile. Nose.  Heart / Thorax                      4-chamber view. RVOT view.    Gender: female.      BIOPHYSICAL PROFILE  ---------------------------------------------------------------------------------------------------------  2: Fetal breathing movements  2: Gross body movements  2: Fetal tone  2: Amniotic fluid volume  8/8 Biophysical profile score      MATERNAL STRUCTURES  ---------------------------------------------------------------------------------------------------------  Cervix                                  Suboptimal  Right Ovary                          Not examined  Left Ovary                            Not examined      RECOMMENDATION  ---------------------------------------------------------------------------------------------------------  We discussed the findings on today's ultrasound with the patient.    Continue  surveillance with twice weekly BPP until delivery. A repeat assessment of fetal growth is also recommended in 3 weeks. Please see EPIC for details of  Sushila's inpatient consultation.    Thank-you for the opportunity to participate in the care of this patient. If you have questions regarding today's  evaluation or if we can be of further service, please contact the  [facility].    **Fetal anomalies may be present but not detected**        Impression    IMPRESSION  ---------------------------------------------------------------------------------------------------------  1) Plata intrauterine pregnancy at 29w 1d gestational age.  2) None of the anomalies commonly detected by ultrasound were evident in the limited fetal anatomic survey described above.  3) Growth parameters and estimated fetal weight were consistent with an appropriate for gestation age pattern of growth.  4) The amniotic fluid volume appeared normal, but appears subjectively low.  5) The BPP is reassuring.

## 2023-10-17 PROCEDURE — 250N000013 HC RX MED GY IP 250 OP 250 PS 637: Performed by: FAMILY MEDICINE

## 2023-10-17 PROCEDURE — 120N000001 HC R&B MED SURG/OB

## 2023-10-17 PROCEDURE — 99232 SBSQ HOSP IP/OBS MODERATE 35: CPT | Performed by: OBSTETRICS & GYNECOLOGY

## 2023-10-17 RX ADMIN — CLINDAMYCIN PALMITATE HYDROCHLORIDE (PEDIATRIC) 450 MG: 75 SOLUTION ORAL at 16:28

## 2023-10-17 RX ADMIN — Medication 1 CAPSULE: at 13:15

## 2023-10-17 RX ADMIN — CEPHALEXIN 500 MG: 250 FOR SUSPENSION ORAL at 13:15

## 2023-10-17 RX ADMIN — Medication 1650 MG: at 13:16

## 2023-10-17 RX ADMIN — CEPHALEXIN 500 MG: 250 FOR SUSPENSION ORAL at 06:59

## 2023-10-17 RX ADMIN — CLINDAMYCIN PALMITATE HYDROCHLORIDE (PEDIATRIC) 450 MG: 75 SOLUTION ORAL at 22:53

## 2023-10-17 RX ADMIN — CEPHALEXIN 500 MG: 250 FOR SUSPENSION ORAL at 20:02

## 2023-10-17 RX ADMIN — CLINDAMYCIN PALMITATE HYDROCHLORIDE (PEDIATRIC) 450 MG: 75 SOLUTION ORAL at 09:41

## 2023-10-17 RX ADMIN — CEPHALEXIN 500 MG: 250 FOR SUSPENSION ORAL at 01:29

## 2023-10-17 RX ADMIN — Medication 2 TABLET: at 13:16

## 2023-10-17 ASSESSMENT — ACTIVITIES OF DAILY LIVING (ADL)
ADLS_ACUITY_SCORE: 20

## 2023-10-17 NOTE — PROGRESS NOTES
Antepartum Progress Note  10/17/2023     S: continues to leak fluid, clear to pink-tinged.  No contractions.  +FM.  Her 3 yo son has  been visiting.  She is making the room comfortable, trying to keep busy.  Having some ear issues, things feeling plugged up a little bit/muffled.     O:  Vitals:    10/17/23 0700 10/17/23 0701 10/17/23 0934 10/17/23 0935   BP:  113/66  105/58   BP Location:  Right arm  Left arm   Patient Position:  Dangled  Sitting   Cuff Size:    Adult Regular   Resp:    16   Temp: 98.8  F (37.1  C) 98.8  F (37.1  C)  98.8  F (37.1  C)   TempSrc: Oral Oral  Oral   SpO2:  99% 98%      General: comfortable, standing up  Left TM hyperemic, right TM pearly grey and without obvious fluid  Abd soft gravid  Cervix: deferred         Comp Follow Up  ----------------------------------------------------------------------------------------------------       Amniotic fluid Amount of AF: normal. MVP 3.0 cm.        FETAL BIOMETRY  ---------------------------------------------------------------------------------------------------------    EFW                                       1,276                  g                                     24%        Hadlock           BIOPHYSICAL PROFILE  ---------------------------------------------------------------------------------------------------------   Biophysical profile score          A/P: 33 year old  @ 29w2d, HD#4 here for PPROM at 28+6.    PPROM:   day 4 of modified latency abx (due to allergies)  S/p BMZ x2 on 10/14 and 10/15  Plan twice weekly BPPs on Tues/Fri and TID NSTs unless lionel  S/p NICU consult, s/p MFM consult  Plan IV magnesium if laboring prior to 32 wks  GBS neg on 10/14,  repeat after 5 weeks if still undelivered  Plan delivery at 34+0, sooner if contraindication to expectant mgmt, consider repeat MFM consult if desiring expectant mgmt beyond 34+0  Consider starting lovenox for VTE ppx.  SCDs in place.     Routine PNC:   Due for GCT,  now s/p BMZ.  Plan QID BG x1w, if >50% above goal, plan to initiate insulin   Recommend covid, RSV, flu vaccines.  S/p TDaP.    S/p Rhogam 10/14    Left ear pain:   Left TM slightly hyperemic, pt complains of muffled hearing on that side for several weeks.  Currently on abx day 4/7, consider repeat otoscope exam after abx finished if symptoms persistent, versus peds/ENT consult    Dispo: inpt until delivery    Jennifer King MD  [facility]     Total time spent was 40 minutes; this includes pre-work time, intra-service time, and post-work time including time spent on documentation which occurred on the date of service.

## 2023-10-17 NOTE — PROVIDER NOTIFICATION
10/17/23 0920   Provider Notification   Provider Name/Title Dr. King   Method of Notification At Bedside   Request Evaluate in Person     MD bedside for rounding. Continue with current POC.

## 2023-10-17 NOTE — PLAN OF CARE
"  Problem: Adult Inpatient Plan of Care  Goal: Plan of Care Review  Description: The Plan of Care Review/Shift note should be completed every shift.  The Outcome Evaluation is a brief statement about your assessment that the patient is improving, declining, or no change.  This information will be displayed automatically on your shift  note.  Outcome: Progressing  Goal: Patient-Specific Goal (Individualized)  Description: You can add care plan individualizations to a care plan. Examples of Individualization might be:  \"Parent requests to be called daily at 9am for status\", \"I have a hard time hearing out of my right ear\", or \"Do not touch me to wake me up as it startles  me\".  Outcome: Progressing  Goal: Absence of Hospital-Acquired Illness or Injury  Outcome: Progressing  Intervention: Prevent Skin Injury  Recent Flowsheet Documentation  Taken 10/17/2023 0100 by Charlotte Meier RN  Body Position: position changed independently  Intervention: Prevent and Manage VTE (Venous Thromboembolism) Risk  Recent Flowsheet Documentation  Taken 10/17/2023 0100 by Charlotte Meier RN  VTE Prevention/Management: SCDs (sequential compression devices) off  Patient declining SCDs overnight.   Patient rest well overnight. Safety checks completed without concern. VS complete and abx administered per MAR at 0655. Patient reported 1 episode of \"moderate\" gush of yellow tinge fluid in the night. Reporting GI discomfort. Discussed with patient GI side effects of abx. EFM completed at midnight with FHT appropriate for gestational age.     Report given to JOCELYN Sanon and JOCELYN Tim.       "

## 2023-10-17 NOTE — PROVIDER NOTIFICATION
10/17/23 8958   Provider Notification   Provider Name/Title Fernando   Method of Notification In Department       Dr King reviewing EFM, ok to remove monitors.  No new orders, will continue to monitor.  Pt instructed to notify nursing staff if feeling ctx or cramping so that monitoring can be done sooner than planned.  Pt verbalized understanding.

## 2023-10-17 NOTE — PROVIDER NOTIFICATION
10/17/23 1710   Provider Notification   Provider Name/Title Fernando   Method of Notification In Department       Dr King reviewing EFM, discussed uterine activity.  Pt reporting some cramping initially when monitors placed but denies for over half an hour when this RN in room. Pt also reports leaking still the same, small amounts of pinkish fluid.  Orders received to continue to monitor for anther 30 minutes due to uterine activity.

## 2023-10-17 NOTE — PROVIDER NOTIFICATION
10/17/23 0342   Provider Notification   Provider Name/Title Dr. Bustillo   Method of Notification Phone   Request Evaluate - Remote   Notification Reason Other (Comment)  (blood glucose monitoring orders.)     Verbal communication with Dr. Bustillo to discontinue blood glucose monitoring.

## 2023-10-17 NOTE — PROVIDER NOTIFICATION
10/17/23 1325   Provider Notification   Provider Name/Title Dr. King   Method of Notification In Department   Request Evaluate - Remote   Notification Reason Status Update     MD at desk, clarified about TID monitoring and VS. MD okay with TID NST (20 minutes if reactive) and Q shift VS. VORB.

## 2023-10-18 PROCEDURE — 120N000001 HC R&B MED SURG/OB

## 2023-10-18 PROCEDURE — 90686 IIV4 VACC NO PRSV 0.5 ML IM: CPT | Performed by: OBSTETRICS & GYNECOLOGY

## 2023-10-18 PROCEDURE — G0008 ADMIN INFLUENZA VIRUS VAC: HCPCS | Performed by: OBSTETRICS & GYNECOLOGY

## 2023-10-18 PROCEDURE — 250N000013 HC RX MED GY IP 250 OP 250 PS 637: Performed by: FAMILY MEDICINE

## 2023-10-18 PROCEDURE — 99231 SBSQ HOSP IP/OBS SF/LOW 25: CPT | Performed by: OBSTETRICS & GYNECOLOGY

## 2023-10-18 PROCEDURE — 250N000011 HC RX IP 250 OP 636: Performed by: OBSTETRICS & GYNECOLOGY

## 2023-10-18 PROCEDURE — 999N000127 HC STATISTIC PERIPHERAL IV START W US GUIDANCE

## 2023-10-18 RX ADMIN — CEPHALEXIN 500 MG: 250 FOR SUSPENSION ORAL at 08:16

## 2023-10-18 RX ADMIN — CLINDAMYCIN PALMITATE HYDROCHLORIDE (PEDIATRIC) 450 MG: 75 SOLUTION ORAL at 09:15

## 2023-10-18 RX ADMIN — Medication 2 TABLET: at 09:18

## 2023-10-18 RX ADMIN — CEPHALEXIN 500 MG: 250 FOR SUSPENSION ORAL at 13:26

## 2023-10-18 RX ADMIN — CLINDAMYCIN PALMITATE HYDROCHLORIDE (PEDIATRIC) 450 MG: 75 SOLUTION ORAL at 22:03

## 2023-10-18 RX ADMIN — INFLUENZA A VIRUS A/VICTORIA/4897/2022 IVR-238 (H1N1) ANTIGEN (FORMALDEHYDE INACTIVATED), INFLUENZA A VIRUS A/DARWIN/9/2021 SAN-010 (H3N2) ANTIGEN (FORMALDEHYDE INACTIVATED), INFLUENZA B VIRUS B/PHUKET/3073/2013 ANTIGEN (FORMALDEHYDE INACTIVATED), AND INFLUENZA B VIRUS B/MICHIGAN/01/2021 ANTIGEN (FORMALDEHYDE INACTIVATED) 0.5 ML: 15; 15; 15; 15 INJECTION, SUSPENSION INTRAMUSCULAR at 12:02

## 2023-10-18 RX ADMIN — Medication 1650 MG: at 09:17

## 2023-10-18 RX ADMIN — CEPHALEXIN 500 MG: 250 FOR SUSPENSION ORAL at 02:45

## 2023-10-18 RX ADMIN — CLINDAMYCIN PALMITATE HYDROCHLORIDE (PEDIATRIC) 450 MG: 75 SOLUTION ORAL at 16:03

## 2023-10-18 RX ADMIN — CEPHALEXIN 500 MG: 250 FOR SUSPENSION ORAL at 19:34

## 2023-10-18 RX ADMIN — Medication 1 CAPSULE: at 09:18

## 2023-10-18 ASSESSMENT — ACTIVITIES OF DAILY LIVING (ADL)
ADLS_ACUITY_SCORE: 20

## 2023-10-18 NOTE — PROVIDER NOTIFICATION
10/17/23 2130   Provider Notification   Provider Name/Title Dr. King   Method of Notification Electronic Page     MD notified that otoscope in room to assess patient's ear/complaints of ear pain.

## 2023-10-18 NOTE — PROVIDER NOTIFICATION
10/17/23 2130   Provider Notification   Provider Name/Title Dr. King   Method of Notification Phone     MD called to update that NST will be TID for 1 hour monitoring. Will bring otoscope to room for MD to assess patient's L ear. Will proceed with plan of care and begin evening NST.

## 2023-10-18 NOTE — CONSULTS
ENT consult --I have been asked to see this patient in consultation by obstetrics service due to recent fluctuating hearing loss. Patient states that for the past few weeks she gets intermittent sensation of plugging in the ears with decreased hearing. Tends to affect the left ear more so than the right ear but can occur in either ear. When it does occur, can last minutes in duration or up to a few hours in duration. No recent URI symptoms otherwise. No Past History of chronic or recurrent ear infections. She denies any active ear pain. Not feeling the hearing loss at present.    Exam:  Widely patent ear canals bilaterally without any cerumen or other inflammatory changes. No otorrhea. Tympanic membranes appear clear and intact bilaterally. No visible inflammation, retraction or middle ear fluid. Remainder of head neck exam normal.    Assessment and plan:  Recent fluctuating ear plugging sensation and intermittent hearing loss. No evidence of middle ear fluid or other specific ear pathology at this point. Likely intermittent Eustachian tube dysfunction. No recommendations for any surgical intervention or specific medications at this time. Happy to obtain an audiogram for patient if desired but cannot provide this as inpatient evaluation. Happy to see her in outpatient clinic whenever feasible. Patient does not feel the need to pursue that at present. Please call if any additional questions or concerns.

## 2023-10-18 NOTE — PROGRESS NOTES
[facility] OB Antepartum Progress Note    Sushila Shafer MRN# [id number]   Age: 33 year old  Gestational age: 29w3d YOB: 1990       Date of Admission: 10/14/2023          Subjective:         Pt comfortable, still leaking moderate pink-tinged fluid, no filomena VB, no UCs.  Fetus active. Pt does have Hx bilateral diminished hearing for 2 weeks, L>R, Left mostly continuously, no congestion or URI sx's.          Objective:          Patient Vitals for the past 12 hrs:   BP Temp Temp src Resp   10/18/23 0825 113/68 98.2  F (36.8  C) Oral 16   10/17/23 2241 104/65 98.5  F (36.9  C) Oral 16     Temp:  [98.2  F (36.8  C)-98.8  F (37.1  C)] 98.2  F (36.8  C)  Resp:  [16] 16  BP: ()/(58-68) 113/68  SpO2:  [97 %-99 %] 99 %      Gen: Well-appearing, NAD  Abdomen: Gravid, Soft, Non-tender   LE: No edema, nontender.     LABS (Last ten results)   CBC  Recent Labs   Lab 10/14/23  1405   WBC 15.1*   HGB 15.0   HCT 42.6           Recent Labs   Lab 10/14/23  1405   CR 0.46*     Recent Labs   Lab 10/16/23  1946 10/16/23  1536 10/16/23  1145 10/16/23  0653 10/15/23  1631 10/15/23  1117   * 117* 113* 101* 117* 130*         Imp-    1) IUP at 29w3d  2) PPROM on 10/14/2023 - no sx's chorio, PTL, had BPP-8/8 10/16  3) S/P BMZ x 2 (10/14-15)  4) Day #5/7 modified latency Abx due to allergies, Keflex and Clinda po  5)  GBS Neg on 10/14  6)  Elev 1hr Glucose - done after BMZ given so results are likely unreliable, BS's done since then all in okay range except slightly elevated FBS  7)  Bilateral hearing loss x 2 weeks, L>R - unknown cause  8)  S/P TDaP  9)  Rh Neg - s/p Rhogam      Plan-  1)  Continue NSTs Q shift or more often as indicated  2)  BPPs twice weekly w/ MFM on Tue/Fri  3)  Has MFM U/S for EFW 11/22 if still pregnant  4)  Mg sulfate if laboring prior to 32 weeks  5)  Tocolysis contraindicated due to PPROM  6)  Plan for probable delivery at 34 weeks if stays pregnant until then, consider  repeat MFM consult if desiring expectant mgmt beyond 34w0d.  7)  Consider Lovenox for VTE prophylaxis, Pt prefers to avoid so will keep SCDs in place while in bed  8)  Consider repeating 1hr Glu 1 week after BMZ completed to see if still elevated  9)  Consider repeat BMZ course if over 1 week since last course and delivery is potentially imminent  10) ENT consult regarding bilateral hearing loss.         Narendra Patel MD

## 2023-10-19 PROCEDURE — 120N000001 HC R&B MED SURG/OB

## 2023-10-19 PROCEDURE — 250N000013 HC RX MED GY IP 250 OP 250 PS 637: Performed by: FAMILY MEDICINE

## 2023-10-19 RX ADMIN — CEPHALEXIN 500 MG: 250 FOR SUSPENSION ORAL at 01:37

## 2023-10-19 RX ADMIN — CLINDAMYCIN PALMITATE HYDROCHLORIDE (PEDIATRIC) 450 MG: 75 SOLUTION ORAL at 22:52

## 2023-10-19 RX ADMIN — CLINDAMYCIN PALMITATE HYDROCHLORIDE (PEDIATRIC) 450 MG: 75 SOLUTION ORAL at 10:16

## 2023-10-19 RX ADMIN — Medication 1650 MG: at 12:02

## 2023-10-19 RX ADMIN — Medication 1 CAPSULE: at 12:01

## 2023-10-19 RX ADMIN — Medication 2 TABLET: at 12:00

## 2023-10-19 RX ADMIN — CEPHALEXIN 500 MG: 250 FOR SUSPENSION ORAL at 19:30

## 2023-10-19 RX ADMIN — CEPHALEXIN 500 MG: 250 FOR SUSPENSION ORAL at 08:21

## 2023-10-19 RX ADMIN — CEPHALEXIN 500 MG: 250 FOR SUSPENSION ORAL at 13:25

## 2023-10-19 RX ADMIN — CLINDAMYCIN PALMITATE HYDROCHLORIDE (PEDIATRIC) 450 MG: 75 SOLUTION ORAL at 18:29

## 2023-10-19 ASSESSMENT — ACTIVITIES OF DAILY LIVING (ADL)
ADLS_ACUITY_SCORE: 20

## 2023-10-19 NOTE — PROGRESS NOTES
10/19/23 1456   Child Life   Location [facility]  (Labor & Delivery: PPROM 28.6 weeks)   Interaction Intent Introduction of Services;Initial Assessment;Chart Review   Method in-person   Individuals Present Patient;Other   Comments (names or other info)  (FOB) Yan also present   Intervention Goal Assess coping/needs of family and 3 yo son, Dylan during extended hospitalization   Intervention Supportive Check in   Supportive Check in CCLS received consult for extended hospitalization s/p PPROM at 28 weeks and 6 days. CCLS met with Sushila and , Yan, to introduce self and child family life services, assess coping and needs, and offer support during extended hospitalization. Yan and Sushila shared that they have figured out a plan for care for their 3 year old, Dylan, for after  care (4 days/week) and he spends 1 day/week with grandparents. Yan is adjusting to being the 100% parent, but feels well supported by his parents and in-laws. They have created a routine that Yan and Dylan come every evening to the hospital to have dinner with Sushila. So far Dylan appears to be coping well in hospital environment and with new routines in place. Family very appreciative of services, however, at this time they feel they are all doing well. Will reach out if additional needs arise.   Distress low distress   Distress Indicators patient report;family report;staff observation   Outcomes/Follow Up Continue to Follow/Support   Outcomes Comment No CFL needs at this time. CCLS will continue to follow patient peripherally and will follow up as needed.   Time Spent   Direct Patient Care 20   Indirect Patient Care 15   Total Time Spent (Calc) 35

## 2023-10-19 NOTE — PLAN OF CARE
Patient stable. VSS. Denies contractions & none noted during monitoring. FHR moderate with accels. Able to rest overnight. IV patent.

## 2023-10-19 NOTE — CONSULTS
Child Family Life consult complete (see note for details).     Family feels comfortable at this time supporting 3 yo son, Dylan, and he appears to be adjusting to new routine well at this time (care after  4x/week, grandparents' house 1x/week, diner with dad and mom at the hospital every night).     Should needs arise or change, please feel free to re-consult Child Family Life or call our office at [phone].     SHIV JungS  Certified Child Life Specialist

## 2023-10-19 NOTE — PROGRESS NOTES
S:  no contractions, no concerns,    O:/54   Temp 98.1  F (36.7  C) (Oral)   Resp 16   LMP 03/26/2023   SpO2 99%    Abdomen: soft    FHT's Category 1     Imp-     1) IUP at 29w4d  2) PPROM on 10/14/2023 - no sx's chorio, PTL, had BPP-8/8 10/16  3) S/P BMZ x 2 (10/14-15)  4) Day #6/7 modified latency Abx due to allergies, Keflex and Clinda po  5)  GBS Neg on 10/14  6)  Elev 1hr Glucose - done after BMZ given so results are likely unreliable, BS's done since then all in okay range except slightly elevated FBS  7)  Bilateral hearing loss x 2 weeks, L>R - unknown cause  8)  S/P TDaP  9)  Rh Neg - s/p Rhogam  10) planning covid vaccination today or tomorrow        Plan-  1)  Continue NSTs Q shift or more often as indicated  2)  BPPs twice weekly w/ MFM on Tue/Fri  3)  Has MFM U/S for EFW 11/22 if still pregnant  4)  Mg sulfate if laboring prior to 32 weeks  5)  Tocolysis contraindicated due to PPROM  6)  Plan for probable delivery at 34 weeks if stays pregnant until then, consider repeat MFM consult if desiring expectant mgmt beyond 34w0d.  7)  Consider Lovenox for VTE prophylaxis, Pt prefers to avoid so will keep SCDs in place while in bed  8)  Planning a repeat 1hr Glu 1 week after BMZ completed to see if still elevated (sometime   Next week)  9)  Consider repeat BMZ course if over 1 week since last course and delivery is potentially imminent  10) ENT consult regarding bilateral hearing loss.  11) Covid vaccination today or tomorrow        Dr. Kaylah Quezada, DO    OB/GYN   [facility]

## 2023-10-19 NOTE — PROVIDER NOTIFICATION
10/19/23 1549 10/19/23 1645 10/19/23 1715   Uterine Activity Assessment   Method external tocotransducer  (monitor applied) external tocotransducer;per patient report;palpation external tocotransducer;per patient report;palpation  (monitor removed)   Contraction Intensity  --  no contractions no contractions   Uterine Resting Tone  --  soft by palpation soft by palpation   Fetal Assessment   Fetal HR Assessment Method external US  (monitor applied) external US external US  (monitor removed)   Fetal HR (beats/min)  --  135 135   Fetal HR Baseline  --  normal range normal range   Fetal HR Variability  --  moderate (amplitude range 6 to 25 bpm) moderate (amplitude range 6 to 25 bpm)   Fetal HR Accelerations  --  increase 15 bpm above baseline lasting 15 seconds increase 15 bpm above baseline lasting 15 seconds   Fetal HR Decelerations  --  absent absent   RN Strip Review  --  Continuous Continuous     Extended monitoring to get more 15x15s as baby has had 15x15s in the past.

## 2023-10-19 NOTE — PROVIDER NOTIFICATION
10/19/23 0859   Provider Notification   Provider Name/Title Pilar   Method of Notification At Bedside   Request Evaluate in Person   Notification Reason Status Update     Strip reviewed POC reviewed.

## 2023-10-20 ENCOUNTER — APPOINTMENT (OUTPATIENT)
Dept: ULTRASOUND IMAGING | Facility: CLINIC | Age: 33
End: 2023-10-20
Attending: OBSTETRICS & GYNECOLOGY
Payer: COMMERCIAL

## 2023-10-20 PROCEDURE — 76819 FETAL BIOPHYS PROFIL W/O NST: CPT

## 2023-10-20 PROCEDURE — 120N000001 HC R&B MED SURG/OB

## 2023-10-20 PROCEDURE — 76818 FETAL BIOPHYS PROFILE W/NST: CPT | Mod: 26 | Performed by: STUDENT IN AN ORGANIZED HEALTH CARE EDUCATION/TRAINING PROGRAM

## 2023-10-20 PROCEDURE — 250N000013 HC RX MED GY IP 250 OP 250 PS 637: Performed by: FAMILY MEDICINE

## 2023-10-20 RX ORDER — ASCORBIC ACID 500 MG
1650 TABLET ORAL DAILY
Status: DISCONTINUED | OUTPATIENT
Start: 2023-10-20 | End: 2023-10-30

## 2023-10-20 RX ORDER — L.ACID/L.CASEI/B.BIF/B.LON/FOS 2B CELL-50
1 CAPSULE ORAL DAILY
Status: DISCONTINUED | OUTPATIENT
Start: 2023-10-20 | End: 2023-10-30

## 2023-10-20 RX ORDER — D-METHORPHAN/PE/ACETAMINOPHEN 10-5-325MG
2 CAPSULE ORAL DAILY
Status: DISCONTINUED | OUTPATIENT
Start: 2023-10-20 | End: 2023-10-30

## 2023-10-20 RX ADMIN — Medication 1 CAPSULE: at 14:00

## 2023-10-20 RX ADMIN — CEPHALEXIN 500 MG: 250 FOR SUSPENSION ORAL at 02:07

## 2023-10-20 RX ADMIN — CLINDAMYCIN PALMITATE HYDROCHLORIDE (PEDIATRIC) 450 MG: 75 SOLUTION ORAL at 23:01

## 2023-10-20 RX ADMIN — Medication 2 TABLET: at 13:59

## 2023-10-20 RX ADMIN — CEPHALEXIN 500 MG: 250 FOR SUSPENSION ORAL at 07:43

## 2023-10-20 RX ADMIN — CEPHALEXIN 500 MG: 250 FOR SUSPENSION ORAL at 13:57

## 2023-10-20 RX ADMIN — CLINDAMYCIN PALMITATE HYDROCHLORIDE (PEDIATRIC) 450 MG: 75 SOLUTION ORAL at 09:29

## 2023-10-20 RX ADMIN — CLINDAMYCIN PALMITATE HYDROCHLORIDE (PEDIATRIC) 450 MG: 75 SOLUTION ORAL at 16:45

## 2023-10-20 RX ADMIN — Medication 1650 MG: at 13:59

## 2023-10-20 RX ADMIN — CEPHALEXIN 500 MG: 250 FOR SUSPENSION ORAL at 20:00

## 2023-10-20 ASSESSMENT — ACTIVITIES OF DAILY LIVING (ADL)
ADLS_ACUITY_SCORE: 20

## 2023-10-20 NOTE — PROVIDER NOTIFICATION
10/20/23 1050   Provider Notification   Provider Name/Title Dr. Lamar   Method of Notification At Bedside   Request Evaluate in Person   Notification Reason Status Update     Patient denies pain or cramping. Admits to leaking small amount of pink fluid. Activity discussed. Plan per provider: up ad sudheer in room and also may walk to water machine a few times a day. Covid vaccine discussed. Patient may get it, thinking about when.

## 2023-10-20 NOTE — CONSULTS
NUTRITION BRIEF NOTE      REASON FOR NUTRITION CONSULT:  Chart reviewed at length of stay (LOS) per policy.    ASSESSMENT:  - No concerns regarding PO intakes upon review of nursing notes.  There are no formal flowsheet recordings.  Is ordering meals via room service at times.  - Available wt trending reviewed, there is no formal admit wt for comparison:  Wt Readings from Last 10 Encounters:   09/07/23 65.4 kg (144 lb 1.6 oz)   08/02/23 61.7 kg (136 lb)     FOLLOW UP:   Did not sign on at this time.  Will recheck chart in 7-10 days, or sooner, if formally consulted.  Please formally consult if needs arise.        Christin Amaro RDN, LD  Clinical Dietitian  3rd floor/ICU: [phone]  All other floors: [phone]  Weekend/holiday: [phone]  Office: [phone]

## 2023-10-20 NOTE — CARE PLAN
Patient was stable overnight. No complaints. VSS, denies vaginal bleeding, or cramping. States she feels some lower abdominal pressure but Prosper did not pick any activity up during NST at 2300. Continues to take PO antibiotics. Seeing MFM today.  Report given to Natasha BANKS.

## 2023-10-20 NOTE — PROGRESS NOTES
Progress Note    32 yo  at 29w5d with PPROM on 10/14/23.    Feels well.  No ctx or cramps.  Baby active.  VSS, AF  Has MFM U/S today at 11:45    FHTs Cat 1 w/o significant uterine activity    A/P:  PPROM, now 29w5d           S/p Beta meth x 2 10/14 and 10/15            Completed latency antibiotics            S/p Rhogam and TdaP , considering COVID vax today            GBS neg on admission  MFM U/S today  Plan for delivery at 34 wks or later if all stable  Tocolysis contraindicated should labor evolve  OK to ambulate on unit on occasion    Oneil Lamar MD

## 2023-10-20 NOTE — PROVIDER NOTIFICATION
10/20/23 1435   Provider Notification   Provider Name/Title Dr. Lamar   Method of Notification At Bedside;Phone   Request Evaluate - Remote   Notification Reason Lab/Diagnostic Study     Reviewed BPP results. Plan per provider: repeat BPP in radiology tomorrow AM and continue with TID fetal monitoring.

## 2023-10-20 NOTE — PROGRESS NOTES
"Please see \"Imaging\" tab under \"Chart Review\" for details of today's visit.    Viji Werner    "

## 2023-10-20 NOTE — PLAN OF CARE
Awaken for antibiotic, requests to go back to sleep. 0920- Awake, monitors applied. Denies pain or cramping. Admits to leaking occasional pink leaking fluid.

## 2023-10-21 ENCOUNTER — APPOINTMENT (OUTPATIENT)
Dept: ULTRASOUND IMAGING | Facility: CLINIC | Age: 33
End: 2023-10-21
Attending: OBSTETRICS & GYNECOLOGY
Payer: COMMERCIAL

## 2023-10-21 PROCEDURE — 120N000001 HC R&B MED SURG/OB

## 2023-10-21 PROCEDURE — 250N000013 HC RX MED GY IP 250 OP 250 PS 637: Performed by: FAMILY MEDICINE

## 2023-10-21 PROCEDURE — 76819 FETAL BIOPHYS PROFIL W/O NST: CPT

## 2023-10-21 RX ADMIN — CEPHALEXIN 500 MG: 250 FOR SUSPENSION ORAL at 08:40

## 2023-10-21 RX ADMIN — Medication 2 TABLET: at 12:36

## 2023-10-21 RX ADMIN — Medication 1650 MG: at 12:36

## 2023-10-21 RX ADMIN — CLINDAMYCIN PALMITATE HYDROCHLORIDE (PEDIATRIC) 450 MG: 75 SOLUTION ORAL at 09:42

## 2023-10-21 RX ADMIN — Medication 1 CAPSULE: at 12:36

## 2023-10-21 RX ADMIN — CEPHALEXIN 500 MG: 250 FOR SUSPENSION ORAL at 02:30

## 2023-10-21 ASSESSMENT — ACTIVITIES OF DAILY LIVING (ADL)
ADLS_ACUITY_SCORE: 20

## 2023-10-21 NOTE — PROVIDER NOTIFICATION
10/21/23 1120   Provider Notification   Provider Name/Title Dr. Chandra   Request Evaluate - Remote   Notification Reason Status Update     Patient leaking small amounts pink/clear fluid. Denies pain or cramping. Update given. To radiology for scheduled US.

## 2023-10-21 NOTE — PLAN OF CARE
Goal Outcome Evaluation:             Pt had visitors most the day- no complaints. Plan of care reviewed- pt stated she wanted to wait to do monitoring until guests were gone- will let this RN know when ready

## 2023-10-21 NOTE — PROVIDER NOTIFICATION
10/20/23 2030   Provider Notification   Provider Name/Title Dr. Lamar   Method of Notification In Department   Request Evaluate - Remote   Notification Reason Status Update     Discussed with Dr. Lamar the possibility if pt remains stable to maybe allow her to go outside supervised maybe once a day.  MD states he would be fine with this if his other partners are on board as well.

## 2023-10-21 NOTE — PROGRESS NOTES
Progress Note    32 yo  at 29w6d  with PPROM on 10/14/23.    Feels well.  No ctx or cramps.  Baby active. Small amount of leaking fluid.  VSS, AF    Had questions about what a  would entail. Discussed this today.    FHTs this AM reactive, but with intermittent minimal variability and variable decels; w/o significant uterine activity    BPP on 10/21: 6/8 (off for fluid)    A/P:  PPROM, now 29w6d            S/p Beta meth x 2 10/14 and 10/15            Completed latency antibiotics            S/p Rhogam and TdaP , considering COVID vax   Lovenox has been recommended; she will consider            GBS neg on admission  TID monitoring and twice weekly BPPs  Plan for delivery at 34 wks or later if all stable  Tocolysis contraindicated should labor evolve  OK to ambulate on unit on occasion  Verbally consented for CS and blood transfusion if indicated    Fernando Chandra MD

## 2023-10-21 NOTE — PLAN OF CARE
Pt resting throughout night. NST reactive. Denies feeling any contractions. Fetus active. Continues to leak small amount of clear fluid. Plan for a rpt BPP in radiology today. Report given to Natasha BANKS.

## 2023-10-22 ENCOUNTER — HEALTH MAINTENANCE LETTER (OUTPATIENT)
Age: 33
End: 2023-10-22

## 2023-10-22 PROCEDURE — 120N000001 HC R&B MED SURG/OB

## 2023-10-22 RX ADMIN — Medication 2 TABLET: at 12:33

## 2023-10-22 RX ADMIN — Medication 1 CAPSULE: at 12:32

## 2023-10-22 RX ADMIN — Medication 1100 MG: at 12:31

## 2023-10-22 ASSESSMENT — ACTIVITIES OF DAILY LIVING (ADL)
ADLS_ACUITY_SCORE: 20

## 2023-10-22 NOTE — PROGRESS NOTES
progress Note    34yo  at 30wks gestation with PPROM on 10/14, now HD#9    Feels well with only occ cramp.  Baby active  Scant fluid loss    VSS afebrile  BPP in Radiology yesterday  and reactive NST Q shift    A/P:  PPROM, now 30wks gestation w/o evidence of labor,chorio or fetal compromise           S/P betamethasone and latency antibiotics           Tocolysis contraindicated            Delivery at 34 wks or possibly beyond if all fetal testing reassuring     Oneil Lamar MD

## 2023-10-22 NOTE — PROVIDER NOTIFICATION
10/22/23 1620   Provider Notification   Provider Name/Title Dr. Lamar   Method of Notification Phone   Request Evaluate - Remote   Notification Reason Decels     Notified MD of variable decels observed in first 30 minutes of EFM period, followed by 30 minutes of Category 1 tracing.  No new orders, okay to remove EFM and continue plan for TID monitoring.

## 2023-10-22 NOTE — PROVIDER NOTIFICATION
10/22/23 0925   Provider Notification   Provider Name/Title Dr. Lamar   Method of Notification In Department;At Bedside   Request Evaluate - Remote   Notification Reason Uterine Activity;Status Update;Decels     MD rounding.  Updated MD that patient reports she feels occasional cramping today.  One contraction observed on current EFM tracing, as well as occasional variable decel with normal FHR baseline, moderate variability, and accelerations.  No new orders.

## 2023-10-22 NOTE — PROVIDER NOTIFICATION
10/22/23 0003   Provider Notification   Provider Name/Title    Method of Notification Phone        updated that EFM on for nighttime monitoring, FHR moderate variability, accels present, variable x1, no contractions. Pt denies VB or pain. Continues to leak small amount of clear fluid.  reviewed tracing, plan to keep EFM on for 30mins past the variable and then discontinue EFM.

## 2023-10-23 PROCEDURE — 120N000001 HC R&B MED SURG/OB

## 2023-10-23 RX ADMIN — Medication 2 TABLET: at 13:06

## 2023-10-23 RX ADMIN — Medication 1650 MG: at 13:07

## 2023-10-23 RX ADMIN — Medication 1 CAPSULE: at 13:06

## 2023-10-23 ASSESSMENT — ACTIVITIES OF DAILY LIVING (ADL)
ADLS_ACUITY_SCORE: 20

## 2023-10-23 NOTE — PROGRESS NOTES
S: doing well, family present   O:/64 (Patient Position: Semi-Coffey's, Cuff Size: Adult Regular)   Pulse 80   Temp 98.5  F (36.9  C) (Oral)   Resp 18   Wt 66.3 kg (146 lb 3.2 oz)   LMP 2023   SpO2 99%   BMI 19.83 kg/m     Doing well, no contractions    FHT's Category 1   Bpps T/F    A:  33 year old y/o  @ 30w1d  wks EGA with PPROM and GBS negative  P:  PPROM,           S/p Beta meth x 2 10/14 and 10/15            Completed latency antibiotics            S/p Rhogam and TdaP , considering COVID vax              Lovenox has been recommended; she will consider            GBS neg on admission  TID monitoring and twice weekly BPPs  Plan for delivery at 34 wks or later per [facility]  Tocolysis contraindicated should labor evolve    Dr. Kaylah Quezada, DO    OB/GYN   [facility]

## 2023-10-24 ENCOUNTER — APPOINTMENT (OUTPATIENT)
Dept: ULTRASOUND IMAGING | Facility: CLINIC | Age: 33
End: 2023-10-24
Attending: OBSTETRICS & GYNECOLOGY
Payer: COMMERCIAL

## 2023-10-24 PROCEDURE — 76819 FETAL BIOPHYS PROFIL W/O NST: CPT

## 2023-10-24 PROCEDURE — 120N000001 HC R&B MED SURG/OB

## 2023-10-24 PROCEDURE — 76819 FETAL BIOPHYS PROFIL W/O NST: CPT | Mod: 26 | Performed by: OBSTETRICS & GYNECOLOGY

## 2023-10-24 RX ADMIN — Medication 2 TABLET: at 13:57

## 2023-10-24 RX ADMIN — Medication 1 CAPSULE: at 13:57

## 2023-10-24 RX ADMIN — Medication 1650 MG: at 13:57

## 2023-10-24 ASSESSMENT — ACTIVITIES OF DAILY LIVING (ADL)
ADLS_ACUITY_SCORE: 20

## 2023-10-24 NOTE — PROVIDER NOTIFICATION
10/24/23 0905   Provider Notification   Provider Name/Title Dr. King   Method of Notification In Department     MD back with recurrent BPP order placed for MFM US. VORB MFM US BPP every Tuesday and Friday until delivered.

## 2023-10-24 NOTE — PROGRESS NOTES
"Please see \"Imaging\" tab under \"Chart Review\" for details of today's US at the [facility].    Clive Morin MD  Maternal-Fetal Medicine    "

## 2023-10-24 NOTE — PROGRESS NOTES
10/24/2023     S: doing well, would like to get some coffee from the cafeteria today.  Occ round ligament pains radiating down to the vulva, not overly painful.  Denies contractions.  +leakage of pink tinged clear fluid.  Has BPP today.     O:/58   Pulse 80   Temp 98.5  F (36.9  C) (Oral)   Resp 18   Wt (P) 66.5 kg (146 lb 11.2 oz)   LMP 2023   SpO2 99%   BMI (P) 19.90 kg/m     Abd soft/NT/gravid  Ext no edema    FHT's Category 1     A:  33 year old y/o  @ 30w2d with PPROM at 28+6    P:  PPROM            S/p BMZ 2 10/14 and 10/15             S/p latency antibiotics             S/p Rhogam and TdaP, considering COVID vax, plan RSV vax after 32 wks              SCDs overnight for VTE ppx, declines lovenox          GBS neg on admission  TID NST x1 hour and twice weekly BPPs (Tues/Fri)  Plan for delivery at 34 wks or later per MFM    OK to ambulate in hallways daily [id number] if RN aware  Consider moving to postpartum room if appropriate for staffing  Planning GCT 10/27       Jennifer King MD, MPH  [facility] OB/[facility]

## 2023-10-24 NOTE — PLAN OF CARE
Vitals stable, afebrile. Pt denies feeling any contractions. She reports leaking a small amount of light pink fluid. Pt reports baby is active. Pt wearing SCDs overnight.

## 2023-10-25 PROCEDURE — 120N000001 HC R&B MED SURG/OB

## 2023-10-25 RX ADMIN — Medication 1 CAPSULE: at 13:50

## 2023-10-25 RX ADMIN — Medication 1650 MG: at 13:51

## 2023-10-25 RX ADMIN — Medication 2 TABLET: at 13:51

## 2023-10-25 ASSESSMENT — ACTIVITIES OF DAILY LIVING (ADL)
ADLS_ACUITY_SCORE: 20

## 2023-10-25 NOTE — PROGRESS NOTES
Antepartum Progress Note  10/25/2023    S:   Doing well. Happy about 8/8 BPP yesterday. Still some pink tinged discharge, no VB. Intermittent CTX. +FM.     O:  /62   Pulse 80   Temp 98.5  F (36.9  C) (Oral)   Resp 18   Wt (P) 66.5 kg (146 lb 11.2 oz)   LMP 2023   SpO2 99%   BMI (P) 19.90 kg/m       Abd: Soft/NT/gravid  Ext: No edema    NSTs reactive    A:  33 year old y/o  at 30w3d with PPROM at 28+6    P:  PPROM            S/p BMZ 2 10/14 and 10/15             S/p latency antibiotics             S/p Rhogam and TdaP, considering COVID vax, plan RSV vax after 32 wks               SCDs overnight for VTE ppx, declines lovenox          GBS neg on admission  TID NST x1 hour and twice weekly BPPs (Tues/Fri)  Plan for delivery at 34 wks or later per MFM    OK to ambulate in hallways daily [id number] if RN aware  Consider moving to postpartum room if appropriate for staffing  Planning GCT 10/27     Krishan Chandra MD MPH  10/25/2023 6:44 PM

## 2023-10-25 NOTE — CONSULTS
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 They have no financial concerns.    Baby Supplies: Have all supplies/will be able to get remaining supplies before baby comes home.     Mental Health History: Pt has no mental health history or PPMD history. Sw discussed the increased risk of PPMD with extended NICU stays. Sw discussed postpartum support [place] and will provide pt with resources on PPMD after delivery.     History of Postpartum Mood Disorders: None, no concerns.     Chemical Health History: NA        INTERVENTION:      SW completed chart review and collaborated with the multidisciplinary team.   Psychosocial Assessment   Introduction to Maternal Child Health  role and scope of practice   Reviewed Hospital and Community Resources   Assessed Chemical Health History and Current Symptoms   Assessed Mental Health History and Current Symptoms   Identified stressors, barriers and family concerns   Provided support and active empathetic listening and validation.   Provided psychoeducation on  mood and anxiety disorders, assessed for any current symptoms or history    ASSESSMENT:    Sw met with pt to introduce sw role and offer support during planned extended stay. Pt reports that she feels like everything is going very well all things considered. Pt's three year old has been coping very well with the situation. Pt's family  visits every day in the evening. Pt's spouse works from home and pt's parents have been providing childcare when pt's son is out of . She reports that she has a good support system and has been getting lots of visits, food, etc. from family and friends. Pt had not post partum mood concerns in her first pregnancy. Sw discussed increased risk for post partum mood disorder but will see pt to provide resources and discuss warning signs in greater depth after delivery. Pt reports that her pregnancy has been uncomplicated up to this point and that her first pregnancy was also relatively uncomplicated. 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 Pt's spouse works from home and will also have a fair amount of flexibility in his work after the baby comes home.     Pt has some concerns about her three year old being able to visit the NICU after baby is born and also discussed uncertainty related to how long baby will need to be in the NICU. Pt offered that Child Life visit was helpful but that she did not feel she needed any additional support from them at this point. Pt could not identify any specific needs/concerns for sw at this time. Sw offered to meet with pt weekly or wait to see pt until after delivery. Pt requested to not meet with sw until after delivery. Pt is aware she can request to meet with sw at any point.           PLAN:       Sw will follow pt throughout her stay and meet with pt after delivery to provide additional support/NICU resources.     SARITHA Rose, [facility]  Inpatient Care Coordination  [facility]  [phone]

## 2023-10-25 NOTE — PLAN OF CARE
0720-Assumed care and report received from Deanna BANKS, pt sleeping, no immediate cares needed at this time.

## 2023-10-25 NOTE — PROGRESS NOTES
Pt vitals stable. Reports leaking scant pink fluid. Baby is active. Pt reports feeling very occasional contractions, not painful. Pt is in good spirits and states she is glad to be able to leave the unit for short periods of time per the plan made today. Wearing SCDs while in bed.

## 2023-10-25 NOTE — PROGRESS NOTES
VSS,  afebrile. Pt denies feeling any contractions or vaginal bleeding. She reports leaking a small amount of light pink fluid. Pt reports baby is active. Pt wearing SCDs overnight.

## 2023-10-26 PROCEDURE — 120N000001 HC R&B MED SURG/OB

## 2023-10-26 RX ADMIN — Medication 2 TABLET: at 13:04

## 2023-10-26 RX ADMIN — Medication 1650 MG: at 13:04

## 2023-10-26 RX ADMIN — Medication 1 CAPSULE: at 13:04

## 2023-10-26 ASSESSMENT — ACTIVITIES OF DAILY LIVING (ADL)
ADLS_ACUITY_SCORE: 20

## 2023-10-26 NOTE — PROVIDER NOTIFICATION
10/26/23 1150   Provider Notification   Provider Name/Title Dr. Gardner   Method of Notification In Department   Request Evaluate-Remote   Notification Reason Status Update     Continues to leak scant pink fluid, no other bleeding. Admits to occasional UC's denies being painful. Fetal and uterine monitors on 8972-6343. Moderate variability, accels present and occasional variables noted. Dr. Gardner in department, update given, viewed fetal tracing from this morning. Appropriate for gestation age.

## 2023-10-26 NOTE — PLAN OF CARE
Up and about in room. Admits to leaking scant pink fluid and occasional uterine tightening.  and son have been here.

## 2023-10-26 NOTE — PLAN OF CARE
VSS. Denies pain. Continues to leak scant pink tinged fluid. FHT's overall cat 1 tracing, 1 broken variable danelle 60, MD aware. Pt feeling guillermo nciholson overnight.

## 2023-10-26 NOTE — PROVIDER NOTIFICATION
10/26/23 0015   Provider Notification   Provider Name/Title Dr. Chandra   Method of Notification In Department   Request Evaluate in Person   Notification Reason Decels     MD notified of broken variable decel, danelle 60's, moderate with accels present. Pt feeling guillermo nicholson. MD reviewed tracing, no new orders.

## 2023-10-27 ENCOUNTER — APPOINTMENT (OUTPATIENT)
Dept: ULTRASOUND IMAGING | Facility: CLINIC | Age: 33
End: 2023-10-27
Attending: OBSTETRICS & GYNECOLOGY
Payer: COMMERCIAL

## 2023-10-27 LAB
ABO/RH(D): ABNORMAL
ANTIBODY ID: NORMAL
ANTIBODY SCREEN: POSITIVE
GLUCOSE SERPL-MCNC: 117 MG/DL (ref 70–99)
SPECIMEN EXPIRATION DATE: ABNORMAL
SPECIMEN EXPIRATION DATE: NORMAL

## 2023-10-27 PROCEDURE — 999N000285 HC STATISTIC VASC ACCESS LAB DRAW WITH PIV START

## 2023-10-27 PROCEDURE — 82947 ASSAY GLUCOSE BLOOD QUANT: CPT | Performed by: OBSTETRICS & GYNECOLOGY

## 2023-10-27 PROCEDURE — 76819 FETAL BIOPHYS PROFIL W/O NST: CPT

## 2023-10-27 PROCEDURE — 999N000127 HC STATISTIC PERIPHERAL IV START W US GUIDANCE

## 2023-10-27 PROCEDURE — 86870 RBC ANTIBODY IDENTIFICATION: CPT | Performed by: OBSTETRICS & GYNECOLOGY

## 2023-10-27 PROCEDURE — 120N000001 HC R&B MED SURG/OB

## 2023-10-27 PROCEDURE — 76819 FETAL BIOPHYS PROFIL W/O NST: CPT | Mod: 26 | Performed by: OBSTETRICS & GYNECOLOGY

## 2023-10-27 PROCEDURE — 250N000009 HC RX 250: Performed by: OBSTETRICS & GYNECOLOGY

## 2023-10-27 PROCEDURE — 86901 BLOOD TYPING SEROLOGIC RH(D): CPT | Performed by: OBSTETRICS & GYNECOLOGY

## 2023-10-27 RX ADMIN — Medication 2 TABLET: at 13:22

## 2023-10-27 RX ADMIN — ALCOHOL 50 G: 65 GEL TOPICAL at 10:45

## 2023-10-27 RX ADMIN — Medication 1650 MG: at 13:22

## 2023-10-27 RX ADMIN — Medication 1 CAPSULE: at 13:22

## 2023-10-27 ASSESSMENT — ACTIVITIES OF DAILY LIVING (ADL)
ADLS_ACUITY_SCORE: 20

## 2023-10-27 NOTE — PROGRESS NOTES
[facility] Antepartum note     A:  33 year old y/o  at 30w4d with PPROM at 28+6     P:  PPROM                        S/p BMZ 2 10/14 and 10/15                         S/p latency antibiotics                         S/p Rhogam and TdaP, considering COVID vax, plan RSV vax after 32 wks               SCDs overnight for VTE ppx, declines lovenox                      GBS neg on admission  TID NST x1 hour and twice weekly BPPs (Tues/Fri)  Plan for delivery at 34 wks or later per MFM     OK to ambulate in hallways daily [id number] if RN aware  Consider moving to postpartum room if appropriate for staffing  Planning GCT Friday, okay to have breakfast         Interval History:     Doing well, no complaints. Family present mom in street cloths and feeling comfortable  Minimal contractions, no bleeding, continuous leaking small amts of fluid.  No fevers, chills,     Baby tracing Category one           Significant Problems:   none          Review of Systems:     As above           Medications:     Current Facility-Administered Medications   Medication    acetaminophen (TYLENOL) tablet 650 mg    alum & mag hydroxide-simethicone (MAALOX) suspension 30 mL    Ca Phosphate-Cholecalciferol 250-12.5 MG-MCG CHEW 2 chew tab [PT SUPPLIED]    COVID-19 mRNA vaccine 12+y (PFIZER) injection 30 mcg    dextrose 5% in lactated ringers infusion    diphenhydrAMINE (BENADRYL) capsule 25 mg    Or    diphenhydrAMINE (BENADRYL) injection 25 mg    diphenhydrAMINE (BENADRYL) capsule 50 mg    Or    diphenhydrAMINE (BENADRYL) injection 50 mg    EPINEPHrine (ADRENALIN) kit 0.3 mg    [START ON 10/27/2023] glucose tolerance test (GLUCOLA) 16% oral liquid 50 g    hydrOXYzine (ATARAX) tablet 50 mg    metoclopramide (REGLAN) injection 10 mg    Or    metoclopramide (REGLAN) tablet 10 mg    ondansetron (ZOFRAN ODT) ODT tab 4 mg    Or    ondansetron (ZOFRAN) injection 4 mg    One-A-Day Womens Petites TABS 2 tablet [PT SUPPLIED]    Probiotic Blend  CAPS 1 capsule [PT SUPPIED]    prochlorperazine (COMPAZINE) injection 10 mg    Or    prochlorperazine (COMPAZINE) tablet 10 mg    Or    prochlorperazine (COMPAZINE) suppository 25 mg    senna-docusate (SENOKOT-S/PERICOLACE) 8.6-50 MG per tablet 1 tablet    Or    senna-docusate (SENOKOT-S/PERICOLACE) 8.6-50 MG per tablet 2 tablet    simethicone (MYLICON) chewable tablet 160 mg    SM Omega-3 CAPS 1,650 mg [PT SUPPLIED]    sodium chloride (PF) 0.9% PF flush 3 mL        10/26/23 1528 98.4  F (36.9  C) -- -- 82 bpm 111/74 18 -- -- -- -- -- CK   10/26/23 0907 98.7  F (37.1  C) -- -- 74 bpm 100/56 18 -- -- None (Room air) -- -- CS     Exam deferred           Data:   All laboratory data reviewed  Lab Results   Component Value Date    WBC 15.1 (H) 10/14/2023    HGB 15.0 10/14/2023    HCT 42.6 10/14/2023     10/14/2023    CR 0.46 (L) 10/14/2023     (H) 10/16/2023           Attestation:  I have reviewed today's vital signs, notes, medications, labs and imaging.     Rajiv Gardner MD

## 2023-10-27 NOTE — PROVIDER NOTIFICATION
10/27/23 0800   Provider Notification   Provider Name/Title Dr. Guardado   Method of Notification In Department   Request Evaluate - Remote   Notification Reason Status Update     0800- Patient sleeping at this time. Update given. 0935- [facility] will come now for US/BPP. Will place monitors on afterwards. Patient admits to feeling occasional lower uterine cramping/tightening's during the night, denies feeling any this morning since she has awakened.

## 2023-10-27 NOTE — PROVIDER NOTIFICATION
10/27/23 1330   Provider Notification   Provider Name/Title Dr. Guardado   Method of Notification In Department   Request Evaluate - Remote   Notification Reason Status Update     Patient continues to leak scant pink fluid, having occasional tightening's, occasional lower uterine cramping, rates pain at 1 if any. Fetal and uterine monitors on from 5088-5910, reactive fetal tracing, appropriate for gestational age. IV site tender to touch, slightly reddened. DC'ed (small abrasion noted under previous IV site and IV restarted per vascular access. Patient finished Glucola beverage at 1050, blood draw at 1150- glucose 117. BPP 8/8 this morning. Update given. No orders received.

## 2023-10-27 NOTE — PLAN OF CARE
Pt vitals stable, afebrile. Pt reports she continues to leak a small amount of pink fluid. FHR category 1. One contraction on toco, Pt states she does feel occasional lower uterine tightenings but states they are not painful, will call if contractions increase in frequency or become uncomfortable. Reports baby is active but feeling less movement today than usual. Pt states she is in good spirits and is keeping busy and maintaining a schedule. Anticipate rotating IV site tomorrow and collecting labs. SCD's on while sleeping.

## 2023-10-27 NOTE — PROGRESS NOTES
VSS. Occasional contractions per patient, denies pain. Continues to leak scant pink tinged fluid. FHT's cat 1 tracing. Moderate variability with 10x10 accelerations noted.

## 2023-10-27 NOTE — PROGRESS NOTES
[facility] Antepartum Progress Note    Razia Shafer MRN# [id number]   Age: 33 year old  Gestational age: 30w5d YOB: 1990       Date of Admission: 10/14/2023          Subjective:         Feeling well. Questions about 1 hr GTT and also RSV vaccine.          Objective:          Patient Vitals for the past 24 hrs:   BP Temp Temp src Resp   10/27/23 1015 100/59 98.9  F (37.2  C) Oral 18   10/26/23 2315 -- -- -- 18   10/26/23 2240 111/62 97.9  F (36.6  C) Oral --     Temp:  [97.9  F (36.6  C)-98.9  F (37.2  C)] 98.9  F (37.2  C)  Resp:  [18] 18  BP: (100-111)/(59-62) 100/59    Results for orders placed or performed during the hospital encounter of 10/14/23 (from the past 24 hour(s))   Maternal Fetal BPP Single    Narrative            BPP  ---------------------------------------------------------------------------------------------------------  Pat. Name: RAZIA SHAFER       Study Date:  10/27/2023 10:08am  Pat. NO:  [id number]        Referring  MD: ELIAS CARRASCO  Site:  [facility]       Sonographer: Rancho Chun RDMS   :  1990        Age:   33  ---------------------------------------------------------------------------------------------------------    INDICATION  ---------------------------------------------------------------------------------------------------------  Circumvallate placenta.   Premature Rupture of Membranes (PPROM).      METHOD  ---------------------------------------------------------------------------------------------------------  Transabdominal ultrasound examination. View: Sufficient      PREGNANCY  ---------------------------------------------------------------------------------------------------------  Plata pregnancy. Number of fetuses: 1      DATING  ---------------------------------------------------------------------------------------------------------                                           Date                                Details                                                                                       Gest. age                      SHONDA  LMP                                  3/26/2023                                                                                                                         30 w + 5 d                     2023  Prior assessment               2023                         GA: 8 w + 4 d                                                                            30 w + 5 d                     2023  Assigned dating                  Dating performed on 2023, based on the LMP                                                             30 w + 5 d                     2023      GENERAL EVALUATION  ---------------------------------------------------------------------------------------------------------  Cardiac activity present.  bpm.  Fetal movements visualized.  Presentation cephalic.  Placenta anterior, no previa > 2 cm from internal os. Circumvallate placenta.  Umbilical cord previously studied.      AMNIOTIC FLUID ASSESSMENT  ---------------------------------------------------------------------------------------------------------  Amount of AF: normal  MVP 2.5 cm      BIOPHYSICAL PROFILE  ---------------------------------------------------------------------------------------------------------  2: Fetal breathing movements  2: Gross body movements  2: Fetal tone  2: Amniotic fluid volume  8/8 Biophysical profile score  Interpretation: normal      RECOMMENDATION  ---------------------------------------------------------------------------------------------------------  We discussed the findings on today's ultrasound with the patient.    The patient is scheduled to continue twice weekly  testing with BPPs.    Return to primary provider for continued prenatal care.    Thank-you for the opportunity to participate in the care of this patient. If you have questions regarding  "today's evaluation or if we can be of further service, please contact the  [facility].    **Fetal anomalies may be present but not detected**        Impression    IMPRESSION  ---------------------------------------------------------------------------------------------------------  1) Normal amniotic fluid volume.  2) BPP is reassuring.       ABO/Rh type and screen    Narrative    The following orders were created for panel order ABO/Rh type and screen.  Procedure                               Abnormality         Status                     ---------                               -----------         ------                     Adult Type and Screen[[id number]]        Abnormal            Final result                 Please view results for these tests on the individual orders.   Glucose   Result Value Ref Range    Glucose 117 (H) 70 - 99 mg/dL   Adult Type and Screen   Result Value Ref Range    ABO/RH(D) O NEG     Antibody Screen Positive (A) Negative    SPECIMEN EXPIRATION DATE     Antibody identification   Result Value Ref Range    Antibody Identification Anti-D post RhoGAM     SPECIMEN EXPIRATION DATE         Gen: Well-appearing, NAD  Reactive NST  Normal BPP today.     LABS (Last ten results)   CBCNo lab results found in last 7 days.   No lab results found in last 7 days.   COAGSNo lab results found in last 7 days.    Invalid input(s): \"FIBRINOGEN\"              Assessment/Plan:          33 year old y.o.  at 30w5d admitted for PPROM at 28+6                               S/p BMZ 2 10/14 and 10/15                         S/p latency antibiotics                         S/p Rhogam and TdaP, considering COVID vax, plan RSV vax at 32 wks               SCDs overnight for VTE ppx, declines lovenox                      GBS neg on admission  TID NST x1 hour and twice weekly BPPs (Tues/Fri)  Plan for delivery at 34 wks or later per [facility]     OK to ambulate in hallways daily " [id number] if RN aware  Consider moving to postpartum room if appropriate for staffing  GCT 10/27: normal.    Usha Guardado MD  [facility] Obstetrics and Gynecology  [facility]

## 2023-10-27 NOTE — PROGRESS NOTES
"BRIEF CLINICAL NUTRITION SERVICES ASSESSMENT     REASON FOR ASSESSMENT:  LOS     NUTRITION HISTORY:  Patient follows a regular diet and endorses a good appetite. She is very lactose intolerant and avoids all dairy.      CURRENT DIET AND INTAKE:  Diet: Regular  Intakes: Patient reports good appetite. She's eating breakfast at the hospital, lunch is mixed between foods from outside of hospital and here, dinners are from family (her 3-year-old comes in with dad).     ANTHROPOMETRICS:  Height: 182.9 cm (6'- 5'11\" per OB records)  Weight: 146 lbs 3.2 oz  Body mass index is 19.9 kg/m  (pended).  Pre-pregnancy BMI: 18.25  Weight Status:  Underweight BMI <18.5 (at baseline)  Weight History:   Wt Readings from Last 20 Encounters:   10/24/23 (P) 66.5 kg (146 lb 11.2 oz)   09/07/23 65.4 kg (144 lb 1.6 oz)   08/02/23 61.7 kg (136 lb)     Per Care Everywhere:  05/25/23 59.8 kg (131 lb 12.8 oz)  09/07/22 59.9 kg (132 lb)    LABS:  Reviewed--noted 1 hr glucose tolerance test with , but test administered after betamethasone so invalid. BGs 107-130    MEDS:  Ca Phosphate-cholecalciferol- 2 chew with lunch  One-A-Day Women's petites x 2/day  Probiotic Blend caps  - SM Omega-3 caps   Pt providing all of the above supplements    Dosing Weight 66.5 kg (most recent weight)    ASSESSED NUTRITION NEEDS PER APPROVED PRACTICE GUIDELINES:  Estimated Energy Needs: [id number] kcals (25-30-35 Kcal/Kg + 452 for 3rd trimester pregnancy)  Justification: increased needs  Recommend upper range--weight gain recommendation of 28-40 lbs d/t underweight at start of pregnancy  Estimated Protein Needs: 73+ grams protein (1.1 g/kg)  Justification: increased needs (based on current weight)  Estimated Fluid Needs: 3000+ mL (AI for pregnancy)  Justification: Increased needs     MALNUTRITION:  Patient does not meet two of the following criteria necessary for diagnosing malnutrition: significant weight loss, reduced intake, subcutaneous fat loss, " muscle loss or fluid retention     NUTRITION INTERVENTION:  Nutrition Diagnosis:  No nutrition diagnosis at this time.     Implementation:  Nutrition Education: (Per Provider order if indicated, not appropriate at this time due to patient condition, patient declined, refer to education activity - Nutrition Education survival information)     FOLLOW UP/MONITORING:   Will re-evaluate in 7 - 10 days, or sooner, if re-consulted.      Jenny Kapoor RD, LD, [facility]  Pager - 3rd floor/ICU: [phone]  Pager - All other floors: [phone]  Pager - Weekend/holiday: [phone]  Office: [phone]

## 2023-10-28 PROCEDURE — 99231 SBSQ HOSP IP/OBS SF/LOW 25: CPT | Performed by: OBSTETRICS & GYNECOLOGY

## 2023-10-28 PROCEDURE — 120N000001 HC R&B MED SURG/OB

## 2023-10-28 RX ADMIN — Medication 1650 MG: at 12:18

## 2023-10-28 RX ADMIN — Medication 2 TABLET: at 12:18

## 2023-10-28 RX ADMIN — Medication 1 CAPSULE: at 12:18

## 2023-10-28 ASSESSMENT — ACTIVITIES OF DAILY LIVING (ADL)
ADLS_ACUITY_SCORE: 20

## 2023-10-28 NOTE — PROVIDER NOTIFICATION
Dr Guardado present in department.  Informed that patient has not been down to tour the NICU yet and would like to.  MD is comfortable with patient going down to NICU for tour.

## 2023-10-28 NOTE — PROGRESS NOTES
[facility] OB Antepartum Progress Note    Sushila Shafer MRN# [id number]   Age: 33 year old  Gestational age: 30w6d YOB: 1990       Date of Admission: 10/14/2023          Subjective:         Patient resting comfortably, no VB, rare UCs, no F/C.  Fetus active.          Objective:          Patient Vitals for the past 12 hrs:   BP Temp Temp src Resp   10/28/23 0000 103/60 98  F (36.7  C) Oral 16     Temp:  [98  F (36.7  C)-98.9  F (37.2  C)] 98  F (36.7  C)  Resp:  [16-18] 16  BP: ()/(52-60) 103/60      Gen: Well-appearing, NAD  Abdomen: Gravid, Soft, Non-tender   LE: No edema, nontender.      Imp:  1) IUP at 30w6d  2) PPROM on 10/14/2023 - no sx's chorio, PTL, had BPP-8/8 10/16  3) S/P BMZ x 2 (10/14-15)  4) S/P latency Abx  5)  GBS Neg on 10/14  6)  Bilateral hearing loss x 2 weeks, L>R - unknown cause  7)  S/P TDaP  8)  Rh Neg - s/p Rhogam      Plan:  1)  Continue NSTs Q shift or more often as indicated  2)  Continue BPPs twice weekly w/ MFM  3)  Has MFM U/S for EFW 11/22 if still pregnant  4)  Mg sulfate if laboring prior to 32 weeks  5)  Tocolysis contraindicated due to PPROM  6)  Plan for probable delivery at 34 weeks if stays pregnant until then, consider repeat MFM consult if desiring expectant mgmt beyond 34w0d.  7)  Declines Lovenox for VTE prophylaxis, Pt prefers to avoid so will keep SCDs in place while in bed  8)  Consider repeat BMZ course if over 1 week since last course and delivery is potentially imminent  9)  Plan RSV vax at 32 weeks.       Narendra Patel MD

## 2023-10-29 PROCEDURE — 91320 SARSCV2 VAC 30MCG TRS-SUC IM: CPT | Performed by: FAMILY MEDICINE

## 2023-10-29 PROCEDURE — 250N000011 HC RX IP 250 OP 636: Performed by: FAMILY MEDICINE

## 2023-10-29 PROCEDURE — 120N000001 HC R&B MED SURG/OB

## 2023-10-29 PROCEDURE — 90480 ADMN SARSCOV2 VAC 1/ONLY CMP: CPT | Performed by: FAMILY MEDICINE

## 2023-10-29 RX ADMIN — Medication 2 TABLET: at 12:34

## 2023-10-29 RX ADMIN — COVID-19 VACCINE, MRNA 30 MCG: 0.05 INJECTION, SUSPENSION INTRAMUSCULAR at 17:12

## 2023-10-29 RX ADMIN — Medication 1650 MG: at 12:34

## 2023-10-29 RX ADMIN — Medication 1 CAPSULE: at 12:34

## 2023-10-29 ASSESSMENT — ACTIVITIES OF DAILY LIVING (ADL)
ADLS_ACUITY_SCORE: 20

## 2023-10-29 NOTE — PROVIDER NOTIFICATION
10/29/23 1816   Provider Notification   Provider Name/Title Dr. SHADI Gardner   Method of Notification Phone   Request Evaluate - Remote   Notification Reason Decels;Uterine Activity;Status Update     Dr Gardner notified of variable decels seen during monitoring.  with moderate variability, accels present. Irregular contractions noted, pt feels tightening, denies pain. Educated pt to notify RN if feeling increased frequency or intensity of contractions.

## 2023-10-29 NOTE — PROVIDER NOTIFICATION
10/29/23 1032   Provider Notification   Provider Name/Title Dr SHADI Gardner   Method of Notification At Bedside   Request Evaluate in Person   Notification Reason Status Update     Dr Gardner at bedside. Pt doing well, stable. No new complaints or changes of note. Denies contractions. Continues to leak pink fluid. Normal fetal movement. Plan for Covid vaccine later today. MD reviewed AM FHR tracing. Continue current plan of care.

## 2023-10-30 ENCOUNTER — ANESTHESIA (OUTPATIENT)
Dept: SURGERY | Facility: CLINIC | Age: 33
End: 2023-10-30
Payer: COMMERCIAL

## 2023-10-30 ENCOUNTER — ANESTHESIA EVENT (OUTPATIENT)
Dept: SURGERY | Facility: CLINIC | Age: 33
End: 2023-10-30
Payer: COMMERCIAL

## 2023-10-30 ENCOUNTER — APPOINTMENT (OUTPATIENT)
Dept: ULTRASOUND IMAGING | Facility: CLINIC | Age: 33
End: 2023-10-30
Attending: FAMILY MEDICINE
Payer: COMMERCIAL

## 2023-10-30 PROCEDURE — 250N000011 HC RX IP 250 OP 636: Mod: JZ | Performed by: ANESTHESIOLOGY

## 2023-10-30 PROCEDURE — 250N000013 HC RX MED GY IP 250 OP 250 PS 637: Performed by: FAMILY MEDICINE

## 2023-10-30 PROCEDURE — 272N000001 HC OR GENERAL SUPPLY STERILE: Performed by: FAMILY MEDICINE

## 2023-10-30 PROCEDURE — 250N000011 HC RX IP 250 OP 636: Performed by: ANESTHESIOLOGY

## 2023-10-30 PROCEDURE — 258N000003 HC RX IP 258 OP 636: Performed by: NURSE ANESTHETIST, CERTIFIED REGISTERED

## 2023-10-30 PROCEDURE — 76819 FETAL BIOPHYS PROFIL W/O NST: CPT

## 2023-10-30 PROCEDURE — 250N000009 HC RX 250: Performed by: NURSE ANESTHETIST, CERTIFIED REGISTERED

## 2023-10-30 PROCEDURE — 88307 TISSUE EXAM BY PATHOLOGIST: CPT | Mod: TC | Performed by: FAMILY MEDICINE

## 2023-10-30 PROCEDURE — 250N000011 HC RX IP 250 OP 636: Performed by: FAMILY MEDICINE

## 2023-10-30 PROCEDURE — 360N000076 HC SURGERY LEVEL 3, PER MIN: Performed by: FAMILY MEDICINE

## 2023-10-30 PROCEDURE — 250N000011 HC RX IP 250 OP 636: Mod: JZ | Performed by: NURSE ANESTHETIST, CERTIFIED REGISTERED

## 2023-10-30 PROCEDURE — 59515 CESAREAN DELIVERY: CPT | Performed by: FAMILY MEDICINE

## 2023-10-30 PROCEDURE — 370N000017 HC ANESTHESIA TECHNICAL FEE, PER MIN: Performed by: FAMILY MEDICINE

## 2023-10-30 PROCEDURE — 710N000010 HC RECOVERY PHASE 1, LEVEL 2, PER MIN: Performed by: FAMILY MEDICINE

## 2023-10-30 PROCEDURE — C1765 ADHESION BARRIER: HCPCS | Performed by: FAMILY MEDICINE

## 2023-10-30 PROCEDURE — 120N000001 HC R&B MED SURG/OB

## 2023-10-30 RX ORDER — ACETAMINOPHEN 325 MG/1
975 TABLET ORAL EVERY 6 HOURS
Status: DISCONTINUED | OUTPATIENT
Start: 2023-10-30 | End: 2023-11-02 | Stop reason: HOSPADM

## 2023-10-30 RX ORDER — SODIUM CHLORIDE, SODIUM LACTATE, POTASSIUM CHLORIDE, CALCIUM CHLORIDE 600; 310; 30; 20 MG/100ML; MG/100ML; MG/100ML; MG/100ML
INJECTION, SOLUTION INTRAVENOUS CONTINUOUS
Status: DISCONTINUED | OUTPATIENT
Start: 2023-10-30 | End: 2023-10-30 | Stop reason: HOSPADM

## 2023-10-30 RX ORDER — KETOROLAC TROMETHAMINE 30 MG/ML
30 INJECTION, SOLUTION INTRAMUSCULAR; INTRAVENOUS EVERY 6 HOURS
Qty: 3 ML | Refills: 0 | Status: COMPLETED | OUTPATIENT
Start: 2023-10-30 | End: 2023-10-31

## 2023-10-30 RX ORDER — OXYCODONE HYDROCHLORIDE 5 MG/1
5 TABLET ORAL EVERY 4 HOURS PRN
Status: DISCONTINUED | OUTPATIENT
Start: 2023-10-30 | End: 2023-11-02 | Stop reason: HOSPADM

## 2023-10-30 RX ORDER — NALOXONE HYDROCHLORIDE 0.4 MG/ML
0.2 INJECTION, SOLUTION INTRAMUSCULAR; INTRAVENOUS; SUBCUTANEOUS
Status: DISCONTINUED | OUTPATIENT
Start: 2023-10-30 | End: 2023-11-02 | Stop reason: HOSPADM

## 2023-10-30 RX ORDER — AMOXICILLIN 250 MG
2 CAPSULE ORAL 2 TIMES DAILY
Status: DISCONTINUED | OUTPATIENT
Start: 2023-10-30 | End: 2023-11-02 | Stop reason: HOSPADM

## 2023-10-30 RX ORDER — OXYTOCIN/0.9 % SODIUM CHLORIDE 30/500 ML
100-340 PLASTIC BAG, INJECTION (ML) INTRAVENOUS CONTINUOUS PRN
Status: DISCONTINUED | OUTPATIENT
Start: 2023-10-30 | End: 2023-11-02 | Stop reason: HOSPADM

## 2023-10-30 RX ORDER — CITRIC ACID/SODIUM CITRATE 334-500MG
30 SOLUTION, ORAL ORAL
Status: COMPLETED | OUTPATIENT
Start: 2023-10-30 | End: 2023-10-30

## 2023-10-30 RX ORDER — OXYTOCIN 10 [USP'U]/ML
10 INJECTION, SOLUTION INTRAMUSCULAR; INTRAVENOUS
Status: DISCONTINUED | OUTPATIENT
Start: 2023-10-30 | End: 2023-10-30 | Stop reason: HOSPADM

## 2023-10-30 RX ORDER — FENTANYL CITRATE 50 UG/ML
INJECTION, SOLUTION INTRAMUSCULAR; INTRAVENOUS
Status: DISCONTINUED
Start: 2023-10-30 | End: 2023-10-30 | Stop reason: HOSPADM

## 2023-10-30 RX ORDER — METHYLERGONOVINE MALEATE 0.2 MG/ML
200 INJECTION INTRAVENOUS
Status: DISCONTINUED | OUTPATIENT
Start: 2023-10-30 | End: 2023-10-30 | Stop reason: HOSPADM

## 2023-10-30 RX ORDER — PROCHLORPERAZINE MALEATE 10 MG
10 TABLET ORAL EVERY 6 HOURS PRN
Status: DISCONTINUED | OUTPATIENT
Start: 2023-10-30 | End: 2023-11-02 | Stop reason: HOSPADM

## 2023-10-30 RX ORDER — FENTANYL CITRATE 50 UG/ML
50 INJECTION, SOLUTION INTRAMUSCULAR; INTRAVENOUS ONCE
Status: COMPLETED | OUTPATIENT
Start: 2023-10-30 | End: 2023-10-30

## 2023-10-30 RX ORDER — FENTANYL CITRATE 50 UG/ML
INJECTION, SOLUTION INTRAMUSCULAR; INTRAVENOUS
Status: COMPLETED | OUTPATIENT
Start: 2023-10-30 | End: 2023-10-30

## 2023-10-30 RX ORDER — MISOPROSTOL 200 UG/1
400 TABLET ORAL
Status: DISCONTINUED | OUTPATIENT
Start: 2023-10-30 | End: 2023-10-30 | Stop reason: HOSPADM

## 2023-10-30 RX ORDER — AMOXICILLIN 250 MG
1 CAPSULE ORAL 2 TIMES DAILY
Status: DISCONTINUED | OUTPATIENT
Start: 2023-10-30 | End: 2023-11-02 | Stop reason: HOSPADM

## 2023-10-30 RX ORDER — CEFAZOLIN SODIUM/WATER 2 G/20 ML
2 SYRINGE (ML) INTRAVENOUS
Status: COMPLETED | OUTPATIENT
Start: 2023-10-30 | End: 2023-10-30

## 2023-10-30 RX ORDER — NALOXONE HYDROCHLORIDE 0.4 MG/ML
0.4 INJECTION, SOLUTION INTRAMUSCULAR; INTRAVENOUS; SUBCUTANEOUS
Status: DISCONTINUED | OUTPATIENT
Start: 2023-10-30 | End: 2023-11-02 | Stop reason: HOSPADM

## 2023-10-30 RX ORDER — MISOPROSTOL 200 UG/1
800 TABLET ORAL
Status: DISCONTINUED | OUTPATIENT
Start: 2023-10-30 | End: 2023-10-30 | Stop reason: HOSPADM

## 2023-10-30 RX ORDER — METOCLOPRAMIDE 10 MG/1
10 TABLET ORAL EVERY 6 HOURS PRN
Status: DISCONTINUED | OUTPATIENT
Start: 2023-10-30 | End: 2023-11-02 | Stop reason: HOSPADM

## 2023-10-30 RX ORDER — DEXAMETHASONE SODIUM PHOSPHATE 4 MG/ML
INJECTION, SOLUTION INTRA-ARTICULAR; INTRALESIONAL; INTRAMUSCULAR; INTRAVENOUS; SOFT TISSUE PRN
Status: DISCONTINUED | OUTPATIENT
Start: 2023-10-30 | End: 2023-10-30

## 2023-10-30 RX ORDER — OXYTOCIN/0.9 % SODIUM CHLORIDE 30/500 ML
PLASTIC BAG, INJECTION (ML) INTRAVENOUS PRN
Status: DISCONTINUED | OUTPATIENT
Start: 2023-10-30 | End: 2023-10-30

## 2023-10-30 RX ORDER — ONDANSETRON 2 MG/ML
4 INJECTION INTRAMUSCULAR; INTRAVENOUS EVERY 6 HOURS PRN
Status: DISCONTINUED | OUTPATIENT
Start: 2023-10-30 | End: 2023-11-02 | Stop reason: HOSPADM

## 2023-10-30 RX ORDER — CLINDAMYCIN PHOSPHATE 900 MG/50ML
900 INJECTION, SOLUTION INTRAVENOUS ONCE
Qty: 50 ML | Refills: 0 | Status: COMPLETED | OUTPATIENT
Start: 2023-10-30 | End: 2023-10-30

## 2023-10-30 RX ORDER — IBUPROFEN 800 MG/1
800 TABLET, FILM COATED ORAL EVERY 6 HOURS
Status: DISCONTINUED | OUTPATIENT
Start: 2023-10-31 | End: 2023-10-31

## 2023-10-30 RX ORDER — ONDANSETRON 4 MG/1
4 TABLET, ORALLY DISINTEGRATING ORAL EVERY 6 HOURS PRN
Status: DISCONTINUED | OUTPATIENT
Start: 2023-10-30 | End: 2023-11-02 | Stop reason: HOSPADM

## 2023-10-30 RX ORDER — CARBOPROST TROMETHAMINE 250 UG/ML
250 INJECTION, SOLUTION INTRAMUSCULAR
Status: DISCONTINUED | OUTPATIENT
Start: 2023-10-30 | End: 2023-10-30 | Stop reason: HOSPADM

## 2023-10-30 RX ORDER — TRANEXAMIC ACID 10 MG/ML
1 INJECTION, SOLUTION INTRAVENOUS EVERY 30 MIN PRN
Status: DISCONTINUED | OUTPATIENT
Start: 2023-10-30 | End: 2023-10-30 | Stop reason: HOSPADM

## 2023-10-30 RX ORDER — DEXTROSE, SODIUM CHLORIDE, SODIUM LACTATE, POTASSIUM CHLORIDE, AND CALCIUM CHLORIDE 5; .6; .31; .03; .02 G/100ML; G/100ML; G/100ML; G/100ML; G/100ML
INJECTION, SOLUTION INTRAVENOUS CONTINUOUS
Status: DISCONTINUED | OUTPATIENT
Start: 2023-10-30 | End: 2023-11-02 | Stop reason: HOSPADM

## 2023-10-30 RX ORDER — BISACODYL 10 MG
10 SUPPOSITORY, RECTAL RECTAL DAILY PRN
Status: DISCONTINUED | OUTPATIENT
Start: 2023-11-01 | End: 2023-11-02 | Stop reason: HOSPADM

## 2023-10-30 RX ORDER — MISOPROSTOL 200 UG/1
400 TABLET ORAL
Status: DISCONTINUED | OUTPATIENT
Start: 2023-10-30 | End: 2023-11-02 | Stop reason: HOSPADM

## 2023-10-30 RX ORDER — BUPIVACAINE HYDROCHLORIDE 7.5 MG/ML
INJECTION, SOLUTION INTRASPINAL
Status: COMPLETED | OUTPATIENT
Start: 2023-10-30 | End: 2023-10-30

## 2023-10-30 RX ORDER — MODIFIED LANOLIN
OINTMENT (GRAM) TOPICAL
Status: DISCONTINUED | OUTPATIENT
Start: 2023-10-30 | End: 2023-11-02 | Stop reason: HOSPADM

## 2023-10-30 RX ORDER — ACETAMINOPHEN 325 MG/1
975 TABLET ORAL ONCE
Status: DISCONTINUED | OUTPATIENT
Start: 2023-10-30 | End: 2023-10-30 | Stop reason: HOSPADM

## 2023-10-30 RX ORDER — PROCHLORPERAZINE 25 MG
25 SUPPOSITORY, RECTAL RECTAL EVERY 12 HOURS PRN
Status: DISCONTINUED | OUTPATIENT
Start: 2023-10-30 | End: 2023-11-02 | Stop reason: HOSPADM

## 2023-10-30 RX ORDER — SIMETHICONE 80 MG
80 TABLET,CHEWABLE ORAL 4 TIMES DAILY PRN
Status: DISCONTINUED | OUTPATIENT
Start: 2023-10-30 | End: 2023-11-02 | Stop reason: HOSPADM

## 2023-10-30 RX ORDER — DEXTROSE, SODIUM CHLORIDE, SODIUM LACTATE, POTASSIUM CHLORIDE, AND CALCIUM CHLORIDE 5; .6; .31; .03; .02 G/100ML; G/100ML; G/100ML; G/100ML; G/100ML
250 INJECTION, SOLUTION INTRAVENOUS ONCE
Status: DISCONTINUED | OUTPATIENT
Start: 2023-10-30 | End: 2023-10-30

## 2023-10-30 RX ORDER — OXYTOCIN/0.9 % SODIUM CHLORIDE 30/500 ML
340 PLASTIC BAG, INJECTION (ML) INTRAVENOUS CONTINUOUS PRN
Status: DISCONTINUED | OUTPATIENT
Start: 2023-10-30 | End: 2023-10-30 | Stop reason: HOSPADM

## 2023-10-30 RX ORDER — OXYTOCIN/0.9 % SODIUM CHLORIDE 30/500 ML
340 PLASTIC BAG, INJECTION (ML) INTRAVENOUS CONTINUOUS PRN
Status: DISCONTINUED | OUTPATIENT
Start: 2023-10-30 | End: 2023-11-02 | Stop reason: HOSPADM

## 2023-10-30 RX ORDER — CARBOPROST TROMETHAMINE 250 UG/ML
250 INJECTION, SOLUTION INTRAMUSCULAR
Status: DISCONTINUED | OUTPATIENT
Start: 2023-10-30 | End: 2023-11-02 | Stop reason: HOSPADM

## 2023-10-30 RX ORDER — OXYTOCIN 10 [USP'U]/ML
10 INJECTION, SOLUTION INTRAMUSCULAR; INTRAVENOUS
Status: DISCONTINUED | OUTPATIENT
Start: 2023-10-30 | End: 2023-11-02 | Stop reason: HOSPADM

## 2023-10-30 RX ORDER — LIDOCAINE 40 MG/G
CREAM TOPICAL
Status: DISCONTINUED | OUTPATIENT
Start: 2023-10-30 | End: 2023-11-02 | Stop reason: HOSPADM

## 2023-10-30 RX ORDER — MISOPROSTOL 200 UG/1
800 TABLET ORAL
Status: DISCONTINUED | OUTPATIENT
Start: 2023-10-30 | End: 2023-11-02 | Stop reason: HOSPADM

## 2023-10-30 RX ORDER — SODIUM CHLORIDE, SODIUM LACTATE, POTASSIUM CHLORIDE, CALCIUM CHLORIDE 600; 310; 30; 20 MG/100ML; MG/100ML; MG/100ML; MG/100ML
INJECTION, SOLUTION INTRAVENOUS CONTINUOUS PRN
Status: DISCONTINUED | OUTPATIENT
Start: 2023-10-30 | End: 2023-10-30

## 2023-10-30 RX ORDER — METOCLOPRAMIDE HYDROCHLORIDE 5 MG/ML
10 INJECTION INTRAMUSCULAR; INTRAVENOUS EVERY 6 HOURS PRN
Status: DISCONTINUED | OUTPATIENT
Start: 2023-10-30 | End: 2023-11-02 | Stop reason: HOSPADM

## 2023-10-30 RX ORDER — ONDANSETRON 2 MG/ML
INJECTION INTRAMUSCULAR; INTRAVENOUS PRN
Status: DISCONTINUED | OUTPATIENT
Start: 2023-10-30 | End: 2023-10-30

## 2023-10-30 RX ORDER — METHYLERGONOVINE MALEATE 0.2 MG/ML
200 INJECTION INTRAVENOUS
Status: DISCONTINUED | OUTPATIENT
Start: 2023-10-30 | End: 2023-11-02 | Stop reason: HOSPADM

## 2023-10-30 RX ORDER — MORPHINE SULFATE 1 MG/ML
INJECTION, SOLUTION EPIDURAL; INTRATHECAL; INTRAVENOUS
Status: COMPLETED | OUTPATIENT
Start: 2023-10-30 | End: 2023-10-30

## 2023-10-30 RX ORDER — TRANEXAMIC ACID 10 MG/ML
1 INJECTION, SOLUTION INTRAVENOUS EVERY 30 MIN PRN
Status: DISCONTINUED | OUTPATIENT
Start: 2023-10-30 | End: 2023-11-02 | Stop reason: HOSPADM

## 2023-10-30 RX ORDER — HYDROCORTISONE 25 MG/G
CREAM TOPICAL 3 TIMES DAILY PRN
Status: DISCONTINUED | OUTPATIENT
Start: 2023-10-30 | End: 2023-11-02 | Stop reason: HOSPADM

## 2023-10-30 RX ADMIN — SODIUM CHLORIDE, SODIUM LACTATE, POTASSIUM CHLORIDE, CALCIUM CHLORIDE, AND DEXTROSE MONOHYDRATE 250 ML: 600; 310; 30; 20; 5 INJECTION, SOLUTION INTRAVENOUS at 15:30

## 2023-10-30 RX ADMIN — OXYTOCIN-SODIUM CHLORIDE 0.9% IV SOLN 30 UNIT/500ML 500 ML: 30-0.9/5 SOLUTION at 16:38

## 2023-10-30 RX ADMIN — ACETAMINOPHEN 975 MG: 325 TABLET, FILM COATED ORAL at 19:33

## 2023-10-30 RX ADMIN — PHENYLEPHRINE HYDROCHLORIDE 50 MCG/MIN: 10 INJECTION INTRAVENOUS at 16:19

## 2023-10-30 RX ADMIN — Medication 2 G: at 16:13

## 2023-10-30 RX ADMIN — PHENYLEPHRINE HYDROCHLORIDE 150 MCG: 10 INJECTION INTRAVENOUS at 16:22

## 2023-10-30 RX ADMIN — SODIUM CHLORIDE, POTASSIUM CHLORIDE, SODIUM LACTATE AND CALCIUM CHLORIDE: 600; 310; 30; 20 INJECTION, SOLUTION INTRAVENOUS at 16:13

## 2023-10-30 RX ADMIN — FENTANYL CITRATE 50 MCG: 50 INJECTION, SOLUTION INTRAMUSCULAR; INTRAVENOUS at 18:22

## 2023-10-30 RX ADMIN — Medication 1 CAPSULE: at 13:58

## 2023-10-30 RX ADMIN — Medication 2 TABLET: at 13:58

## 2023-10-30 RX ADMIN — Medication 1650 MG: at 13:59

## 2023-10-30 RX ADMIN — FENTANYL CITRATE 15 MCG: 50 INJECTION INTRAMUSCULAR; INTRAVENOUS at 16:15

## 2023-10-30 RX ADMIN — FENTANYL CITRATE 50 MCG: 50 INJECTION, SOLUTION INTRAMUSCULAR; INTRAVENOUS at 18:19

## 2023-10-30 RX ADMIN — CLINDAMYCIN PHOSPHATE 900 MG: 900 INJECTION, SOLUTION INTRAVENOUS at 16:41

## 2023-10-30 RX ADMIN — KETOROLAC TROMETHAMINE 30 MG: 30 INJECTION, SOLUTION INTRAMUSCULAR; INTRAVENOUS at 18:21

## 2023-10-30 RX ADMIN — SODIUM CITRATE AND CITRIC ACID MONOHYDRATE 30 ML: 500; 334 SOLUTION ORAL at 15:49

## 2023-10-30 RX ADMIN — BUPIVACAINE HYDROCHLORIDE IN DEXTROSE 1.6 ML: 7.5 INJECTION, SOLUTION SUBARACHNOID at 16:15

## 2023-10-30 RX ADMIN — MORPHINE SULFATE 0.2 MG: 1 INJECTION EPIDURAL; INTRATHECAL; INTRAVENOUS at 16:15

## 2023-10-30 RX ADMIN — DEXAMETHASONE SODIUM PHOSPHATE 4 MG: 4 INJECTION, SOLUTION INTRA-ARTICULAR; INTRALESIONAL; INTRAMUSCULAR; INTRAVENOUS; SOFT TISSUE at 16:26

## 2023-10-30 RX ADMIN — ONDANSETRON 4 MG: 2 INJECTION INTRAMUSCULAR; INTRAVENOUS at 16:15

## 2023-10-30 ASSESSMENT — ACTIVITIES OF DAILY LIVING (ADL)
ADLS_ACUITY_SCORE: 20
ADLS_ACUITY_SCORE: 24
ADLS_ACUITY_SCORE: 20

## 2023-10-30 NOTE — PROVIDER NOTIFICATION
10/30/23 1508   Provider Notification   Provider Name/Title Pilar   Method of Notification Phone   Request Evaluate - Remote     No fetal movement yet per US tech at bedside. Requesting possible apple juice. Patient last ate 2 hours ago. Dr. Quezada updated per phone. Plan per provider: give bolus D5LR 250 ml bolus and she will be up to see patient.

## 2023-10-30 NOTE — ANESTHESIA PROCEDURE NOTES
"Intrathecal injection Procedure Note    Pre-Procedure   Staff -        Anesthesiologist:  Alda Kline MD       Performed By: anesthesiologist       Location: OR       Procedure Start/Stop Times: 10/30/2023 4:15 PM and 10/30/2023 4:18 PM       Pre-Anesthestic Checklist: patient identified, IV checked, risks and benefits discussed, informed consent, monitors and equipment checked, pre-op evaluation and at physician/surgeon's request  Timeout:       Correct Patient: Yes        Correct Procedure: Yes        Correct Site: Yes        Correct Position: Yes   Procedure Documentation  Procedure: intrathecal injection       Patient Position: sitting       Skin prep: Chloraprep       Insertion Site: L3-4. (midline approach).       Needle Gauge: 25.        Needle Length (Inches): 3.5        Spinal Needle Type: Pencan       Introducer used       Introducer: 20 G       # of attempts: 1 and  # of redirects:  0    Assessment/Narrative         Paresthesias: No.       Sensory Level: T6       CSF fluid: clear.       Opening pressure was cmH2O while  Sitting.      Medication(s) Administered   0.75% Hyperbaric Bupivacaine (Intrathecal) - Intrathecal   1.6 mL - 10/30/2023 4:15:00 PM  Fentanyl PF (Intrathecal) - Intrathecal   15 mcg - 10/30/2023 4:15:00 PM  Morphine PF 1 mg/mL (Intrathecal) - Intrathecal   0.2 mg - 10/30/2023 4:15:00 PM  Medication Administration Time: 10/30/2023 4:15 PM      FOR [facility] ([facility]) ONLY:   Pain Team Contact information: please page the Pain Team Via Link To Media. Search \"Pain\". During daytime hours, please page the attending first. At night please page the resident first.      "

## 2023-10-30 NOTE — PROVIDER NOTIFICATION
10/30/23 1010   Provider Notification   Provider Name/Title Dr. Quezada   Method of Notification In Department   Request Evaluate - Remote   Notification Reason Variability Change;Decels     Monitors applied at 0910. 1010- Occasional variables, moderate variability with occasional minimum variability noted. Accels present. Dr. Quezada updated, fetal tracing reviewed. Plan per provider: Okay to remove monitors. 1020- minimal variability noted. Will continue monitoring for now. 1130-  moderate variability, accels present. Monitors removed.

## 2023-10-30 NOTE — ANESTHESIA CARE TRANSFER NOTE
Patient: Sushila Shafer    Procedure: Procedure(s):   section       Diagnosis: 32 weeks gestation of pregnancy [Z3A.32]  Diagnosis Additional Information: No value filed.    Anesthesia Type:   No value filed.     Note:    Oropharynx: oropharynx clear of all foreign objects  Level of Consciousness: awake  Oxygen Supplementation: room air    Independent Airway: airway patency satisfactory and stable  Dentition: dentition unchanged  Vital Signs Stable: post-procedure vital signs reviewed and stable  Report to RN Given: handoff report given  Patient transferred to: Labor and Delivery    Handoff Report: Identifed the Patient, Identified the Reponsible Provider, Reviewed the pertinent medical history, Discussed the surgical course, Reviewed Intra-OP anesthesia mangement and issues during anesthesia, Set expectations for post-procedure period and Allowed opportunity for questions and acknowledgement of understanding      Vitals:  Vitals Value Taken Time   BP 99/55 10/30/23 1727   Temp     Pulse     Resp     SpO2 95 % 10/30/23 1727   Vitals shown include unfiled device data.    Electronically Signed By: SOFY Bustamante CRNA  2023  5:32 PM

## 2023-10-30 NOTE — ANESTHESIA PREPROCEDURE EVALUATION
"Anesthesia Pre-Procedure Evaluation    Patient: Sushila Shafer   MRN: [id number] : 1990        Procedure : Procedure(s):   section          Past Medical History:   Diagnosis Date    Ocular migraine       Past Surgical History:   Procedure Laterality Date    MAMMOPLASTY AUGMENTATION        Allergies   Allergen Reactions    Azithromycin Hives, Itching and Rash     Other reaction(s): Itching, Pruritus      Lactose Other (See Comments)    Penicillins Rash      Social History     Tobacco Use    Smoking status: Never    Smokeless tobacco: Never   Substance Use Topics    Alcohol use: Not Currently      Wt Readings from Last 1 Encounters:   10/29/23 68 kg (150 lb)        Anesthesia Evaluation            ROS/MED HX  ENT/Pulmonary:  - neg pulmonary ROS     Neurologic:       Cardiovascular:  - neg cardiovascular ROS     METS/Exercise Tolerance:     Hematologic:       Musculoskeletal:       GI/Hepatic:       Renal/Genitourinary:       Endo:       Psychiatric/Substance Use:       Infectious Disease:       Malignancy:       Other:            Physical Exam    Airway        Mallampati: II   TM distance: > 3 FB   Neck ROM: full   Mouth opening: > 3 cm    Respiratory Devices and Support         Dental           Cardiovascular             Pulmonary                   OUTSIDE LABS:  CBC:   Lab Results   Component Value Date    WBC 15.1 (H) 10/14/2023    HGB 15.0 10/14/2023    HCT 42.6 10/14/2023     10/14/2023     BMP:   Lab Results   Component Value Date    CR 0.46 (L) 10/14/2023     (H) 10/27/2023     (H) 10/16/2023     COAGS: No results found for: \"PTT\", \"INR\", \"FIBR\"  POC: No results found for: \"BGM\", \"HCG\", \"HCGS\"  HEPATIC: No results found for: \"ALBUMIN\", \"PROTTOTAL\", \"ALT\", \"AST\", \"GGT\", \"ALKPHOS\", \"BILITOTAL\", \"BILIDIRECT\", \"LOREE\"  OTHER: No results found for: \"PH\", \"LACT\", \"A1C\", \"ABRAHAM\", \"PHOS\", \"MAG\", \"LIPASE\", \"AMYLASE\", \"TSH\", \"T4\", \"T3\", \"CRP\", \"SED\"    Anesthesia Plan    ASA " Status:  2, emergent       Anesthesia Type: Spinal.              Consents    Anesthesia Plan(s) and associated risks, benefits, and realistic alternatives discussed. Questions answered and patient/representative(s) expressed understanding.     - Discussed:     - Discussed with:  Patient            Postoperative Care            Comments:    Other Comments: PPROM @ 31.1 weeks            Chemo Marcial MD

## 2023-10-30 NOTE — PROVIDER NOTIFICATION
10/30/23 1535   Provider Notification   Provider Name/Title Pilar   Method of Notification At Bedside   Request Evaluate in Person   Notification Reason Status Update     BPP . Viewed fetal tracing, SVE 1 cm. Plan per provider: proceed with  section.  at bedside. Patient OK with POC.

## 2023-10-30 NOTE — PROVIDER NOTIFICATION
10/30/23 0810   Provider Notification   Provider Name/Title Dr. Quezada   Method of Notification In Department   Request Evaluate - Remote   Notification Reason Status Update     Patient sleeping, will come back later to see patient.

## 2023-10-30 NOTE — OP NOTE
PREOPERATIVE DIAGNOSIS: A 33 year old-year-old  at 31w1d weeks estimated gestational age admitted for PPROM @ 29 5/7 weeks with category II tracing and bpp 2/10, prematurity.  POSTOPERATIVE DIAGNOSES: A 33 year old-year-old  at 31w1d weeks estimated gestational age admitted for PPROM @ 29 5/7 weeks with category II tracing and bpp 2/10, prematurity.  PROCEDURE:   1. Primary classical  section.   2. Seprafilm placement for adhesiolysis.   COMPLICATIONS: None apparent at time of procedure.   ESTIMATED BLOOD LOSS: 191 mL.   SURGEON: Kaylah Quezada DO.   INDICATIONS: A 33 year old-year-old  at 31w1d weeks estimated gestational age admitted for PPROM @ 29 5/7 weeks with category II tracing and bpp 2/10, prematurity.  I examined her and she was 1 cm dilated.  I recommended an urgent  section and we proceeded with this.   Patient signed consent for primary  section, risks benefits, alternatives are discussed with the patient.    OUTCOME: Female infant 3# 6 oz apgars 8 at 1 minute and 8 at 5 minutes.   Findings:   Normal uterus, tubes and ovaries. The lower uterine segment was not developed and the baby was low in the pelvis with difficult access to the DUTCH, due to prematurity.  A horizontal incision in the active segment of the uterus was made (classical).  I discussed this with the patient and recommend further deliveries be between 36-37 weeks via repeat  section, and I do not recommend further labors in subsequent pregnancies.   DETAILS OF PROCEDURE: After informed consent was signed, the patient was placed in dorsal supine position, spinal placed for anesthesia. Fetal heart tones were obtained and found to be in 150s. The patient was prepped and draped in normal sterile fashion and a time out was performed. Pre-operative antibiotics were given. Adequate anesthesia was reported per patient after the spinal was dosed to a surgical level.  A pfannensteil incision was  then carried down to the underlying fascia and incised in the midline. The fascia is dissected off the rectus muscles superiorly and inferiorly. Blunt/sharp dissection of the peritoneum was performed and blunt stretching of the muscles was perfomed. The uterus was visualized.  An jayne retractor is placed. The lower uterine segment was incised with a scalpel in the active segment due to non-development of the lower uterine segment and also decreased acces to the pelvis due to baby being low in the pelvis. Blunt stretching was performed and the baby delivered atraumatically.  The nose and mouth were bulb suctioned. The cord was clamped and cut after 1 minute.  Infant was taken over to the waiting  staff. The placenta spontaneously delivered. The uterus was cleared of all clots and debris. The placenta was examined and found to be complete.  The lower uterine segment was reapproximated with 0 Monocryl in a running locked fashion. A second layer of the same suture was used for imbrication. The bladder flap was recreated using 3-0 Monocryl. The jayne retractor is removed. Irrigation was performed. Seprafilm was placed over the bladder flap. Hemostasis was noted. The peritoneum was closed with 3-0 monocryl. The rectus muscles were reapproximated in the midline and closed with several horizontal mattress sutures of 3-0 monocryl.  Hemostasis was assured with Bovie cautery on the rectus muscles, and Seprafilm was placed over it. The fascia was reapproximated with 0 looped PDS in a running fashion with good reapproximation. A subcuticular stitch using 4-0 Monocryl was used to reapproximate the skin. Steri-Strips, telfa, and tegaderm was applied. The patient tolerated the procedure well. Sponge, lap and needle counts were correct x2.   ELIAS CARRASCO DO

## 2023-10-30 NOTE — PROGRESS NOTES
S:  Marilyn, some bleeding noted,   Recurrent variable decelerations, bpp 2/10  O:/61   Pulse 80   Temp 98.8  F (37.1  C) (Oral)   Resp 18   Wt 68 kg (150 lb)   LMP 2023   SpO2 99%   BMI 20.34 kg/m     :      FHT's Category 2 with baseline 150s    A:  33 year old y/o  @ 31w1d  wks EGA with PPROM and GBS negative,   Early labor with category II tracing    1) IUP at 31 1/7 weeks  2) PPROM on 10/14/2023 - with category II tracing, bpp 2/10   Recommend delivery via urgent  section  3) S/P BMZ x 2 (10/14-15)  4) S/P latency Abx  5)  GBS Neg on 10/14  6)  Bilateral hearing loss x 2 weeks, L>R - unknown cause  7)  S/P TDaP  8)  Rh Neg - s/p Rhogam        Plan:  1)  proceed with urgent  section,   Not enough time for magnesium sulfate, no fever,   Antibiotic ancef and clindamycin,   Has gotten all the latency antibiotics, and BMZ as above.            Dr. Kaylah Quezada, DO    OB/GYN   [facility]

## 2023-10-30 NOTE — PROVIDER NOTIFICATION
10/30/23 1408   Provider Notification   Provider Name/Title Dr. Quezada   Method of Notification Phone   Request Evaluate - Remote   Notification Reason Status Update     1345- Up to BR, dime size area of bleeding noted. Patient has had small amounts of leaking pink fluid today, states leaking fluid has been pretty steady the past few days. Feels some cramping and some uterine tightening's, rates pain at 1. 1408- variables noted, minimal to moderate variability, patient positioned to right side. Update given. Plan per provider: Obtain bedside BPP.

## 2023-10-31 LAB
ABO/RH(D): NORMAL
FETAL BLEED SCREEN: NORMAL
HGB BLD-MCNC: 12.2 G/DL (ref 11.7–15.7)
SPECIMEN EXPIRATION DATE: NORMAL

## 2023-10-31 PROCEDURE — 85461 HEMOGLOBIN FETAL: CPT | Performed by: FAMILY MEDICINE

## 2023-10-31 PROCEDURE — 120N000001 HC R&B MED SURG/OB

## 2023-10-31 PROCEDURE — 86901 BLOOD TYPING SEROLOGIC RH(D): CPT | Performed by: FAMILY MEDICINE

## 2023-10-31 PROCEDURE — 250N000013 HC RX MED GY IP 250 OP 250 PS 637: Performed by: OBSTETRICS & GYNECOLOGY

## 2023-10-31 PROCEDURE — 36415 COLL VENOUS BLD VENIPUNCTURE: CPT | Performed by: FAMILY MEDICINE

## 2023-10-31 PROCEDURE — 250N000013 HC RX MED GY IP 250 OP 250 PS 637: Performed by: FAMILY MEDICINE

## 2023-10-31 PROCEDURE — 250N000011 HC RX IP 250 OP 636: Mod: JZ | Performed by: FAMILY MEDICINE

## 2023-10-31 PROCEDURE — 85018 HEMOGLOBIN: CPT | Performed by: FAMILY MEDICINE

## 2023-10-31 PROCEDURE — 999N000079 HC STATISTIC IP LACTATION SERVICES 1-15 MIN

## 2023-10-31 RX ORDER — PHENAZOPYRIDINE HYDROCHLORIDE 100 MG/1
100 TABLET, FILM COATED ORAL 3 TIMES DAILY PRN
Status: COMPLETED | OUTPATIENT
Start: 2023-10-31 | End: 2023-11-01

## 2023-10-31 RX ORDER — IBUPROFEN 100 MG/5ML
600 SUSPENSION, ORAL (FINAL DOSE FORM) ORAL EVERY 6 HOURS
Status: DISCONTINUED | OUTPATIENT
Start: 2023-10-31 | End: 2023-11-02 | Stop reason: HOSPADM

## 2023-10-31 RX ADMIN — ACETAMINOPHEN 975 MG: 325 TABLET, FILM COATED ORAL at 14:46

## 2023-10-31 RX ADMIN — SENNOSIDES AND DOCUSATE SODIUM 1 TABLET: 8.6; 5 TABLET ORAL at 08:58

## 2023-10-31 RX ADMIN — PHENAZOPYRIDINE 100 MG: 100 TABLET ORAL at 18:29

## 2023-10-31 RX ADMIN — PHENAZOPYRIDINE 100 MG: 100 TABLET ORAL at 10:58

## 2023-10-31 RX ADMIN — KETOROLAC TROMETHAMINE 30 MG: 30 INJECTION, SOLUTION INTRAMUSCULAR; INTRAVENOUS at 06:48

## 2023-10-31 RX ADMIN — IBUPROFEN 600 MG: 200 SUSPENSION ORAL at 20:06

## 2023-10-31 RX ADMIN — OXYCODONE HYDROCHLORIDE 5 MG: 5 TABLET ORAL at 15:48

## 2023-10-31 RX ADMIN — ACETAMINOPHEN 975 MG: 325 TABLET, FILM COATED ORAL at 07:45

## 2023-10-31 RX ADMIN — OXYCODONE HYDROCHLORIDE 5 MG: 5 TABLET ORAL at 10:06

## 2023-10-31 RX ADMIN — HUMAN RHO(D) IMMUNE GLOBULIN 300 MCG: 1500 SOLUTION INTRAMUSCULAR; INTRAVENOUS at 11:23

## 2023-10-31 RX ADMIN — OXYCODONE HYDROCHLORIDE 5 MG: 5 TABLET ORAL at 01:47

## 2023-10-31 RX ADMIN — SENNOSIDES AND DOCUSATE SODIUM 1 TABLET: 8.6; 5 TABLET ORAL at 20:06

## 2023-10-31 RX ADMIN — ACETAMINOPHEN 975 MG: 325 TABLET, FILM COATED ORAL at 01:44

## 2023-10-31 RX ADMIN — KETOROLAC TROMETHAMINE 30 MG: 30 INJECTION, SOLUTION INTRAMUSCULAR; INTRAVENOUS at 00:44

## 2023-10-31 RX ADMIN — OXYCODONE HYDROCHLORIDE 5 MG: 5 TABLET ORAL at 06:02

## 2023-10-31 RX ADMIN — OXYCODONE HYDROCHLORIDE 5 MG: 5 TABLET ORAL at 20:06

## 2023-10-31 RX ADMIN — ACETAMINOPHEN 975 MG: 325 TABLET, FILM COATED ORAL at 21:24

## 2023-10-31 RX ADMIN — IBUPROFEN 600 MG: 200 SUSPENSION ORAL at 13:27

## 2023-10-31 RX ADMIN — SIMETHICONE 80 MG: 80 TABLET, CHEWABLE ORAL at 12:40

## 2023-10-31 ASSESSMENT — ACTIVITIES OF DAILY LIVING (ADL)
ADLS_ACUITY_SCORE: 20
ADLS_ACUITY_SCORE: 21
ADLS_ACUITY_SCORE: 20
ADLS_ACUITY_SCORE: 20
ADLS_ACUITY_SCORE: 24
ADLS_ACUITY_SCORE: 21
ADLS_ACUITY_SCORE: 24
ADLS_ACUITY_SCORE: 20
ADLS_ACUITY_SCORE: 21

## 2023-10-31 NOTE — PLAN OF CARE
Data: Vital signs within normal limits. Postpartum checks within normal limits - see flow record. Patient eating and drinking normally. Patient able to empty bladder independently and is up ambulating. No apparent signs of infection. Patient performing self cares,and is pumping every 3 hours.  Incision appears within normal. Is using Tylenol, Toradol, and Oxycodone for moderate/high discomfort.    Action: Patient medicated during the shift for pain and cramping. See MAR. Patient education done, see flow record.  Response: NICU iPad in room. Patient's spouse present this shift.   Plan: Continue current plan of care.  Anticipate discharge on 11/2/23.

## 2023-10-31 NOTE — L&D DELIVERY NOTE
OB  Delivery Note      Sushila Shafer MRN# [id number]   Age: 33 year old YOB: 1990       GA: 31w1d  GP:   Labor Complications:    EBL:   mL  Delivery QBL:    Delivery Type: , Classical   ROM to Delivery Time: (Delivered) Days: 16 Hours: 9 Minutes: 7     1 Minute 5 Minute 10 Minute   Apgar Totals: 8   8        Personel Present: MARINA GLASS      Details    Pre-Op Diagnosis: 1. Intrauterine pregnancy at 31w1d  2. Non-reassuring fetal status, bpp 2/10  3. PPROM  4. Prematurity   Post-Op Diagnosis: 1. SAME   Indications:  Fetal intolerance;Other (Comment)    Procedure:   , Classical  via   incision   Anesthesia: Spinal      Informed Consent:  The risks, benefits, complications, and alternatives were discussed with the patient. The patient understood that the risks of  section include, but are not limited to: injury to nearby structures or organs, infection, blood loss and possible need for transfusion, and potential need for more surgery including hysterectomy. The patient stated understanding and desired to proceed. All questions were answered. The site of surgery was properly noted and marked. The patient was identified as Sushila Shafer and the procedure verified as a  delivery. A Time Out was held and the above information confirmed.    Procedure Details: see operative details.      Prema Shafer-Sushila [[id number]]      Labor Length      3rd Stage (hrs): 0 (min): 2          Labor Events     labor?: Yes   steroids: Full Course  Labor Type: Spontaneous       Rupture date/time: 10/14/23 0730   Rupture type: Spontaneous Rupture of Membranes  Fluid color: Clear, Pink, Other (comment)  Fluid odor: Normal       Delivery/Placenta Date and Time      Delivery Date: 10/30/23 Delivery Time:  4:37 PM   Placenta Date/Time: 10/30/2023  4:39 PM  Delivering clinician: Kaylah Quezada,    Other personnel present at  delivery:  Provider Role   Laurie Navarrete RN              Apgars    Living status: Living   1 Minute 5 Minute 10 Minute 15 Minute 20 Minute   Skin color: 1  1       Heart rate: 2  2       Reflex irritability: 2  2       Muscle tone: 2  2       Respiratory effort: 1  1       Total: 8  8       Apgars assigned by: SHANKAR العراقي       Cord      Vessels: 3 Vessels    Cord Complications: None                       Delayed cord clamping?: Yes    Cord Clamping Delay (seconds): 31-60 seconds            Stem cell collection?: No           Pavo Resuscitation    Methods: Oxygen, NCPAP, Oximetry, Temp Skin Control, Temp Skin Probe, Polyethylene Bag, Transwarmer, Maldonado Puff       Pavo Care at Delivery: Called by Dr Quezada to the ,  delivery at 31.1 weeks. Infant cried at abdomen, timed cord clamping done for 50 sec, infant was brought to the heated warmer where she was placed in polyethylene bag on a transwarmer, CPAP +5 initiated, pulse oximetry and skin probe placed.O2 sats adequate and improving on 21 % FIO2. Infant was weighed, PIV inserted , shown to parents and transported to NICU for further management  Plan of care discussed with parents  SHANKAR العراقي 10/30/2023 5:36 PM          Pavo Measurements      Weight: 3 lb 5.6 oz      Head circumference: 27.3 cm           Labor Events and Shoulder Dystocia    Fetal Tracing Prior to Delivery: Category 2  Fetal Tracing Comments: bpp 2/10                 Delivery (Maternal) (Provider to Complete) ([id number])                 Blood Loss  Mother: Sushila Shafer #[id number]     Start of Mother's Information      Delivery Blood Loss  10/30/23 1633 - 10/31/23 0133      Total Surgical QBL Blood Loss (mL) Hospital Encounter 191 mL    Total  191 mL               End of Mother's Information  Mother: Sushila Shafer #[id number]                Delivery - Provider to Complete ([id number])    Delivering clinician: Kaylah Quezada,  DO  Delivery Type (Choose the 1 that will go to the Birth History): , Classical                          Priority: Urgent      Specifics: Primary multiparous     Indications for : Fetal intolerance, Other (Comment)     Other Indications: PPROM with cat II tracing, bpp 2/10     Other personnel:  Provider Role   Laurie Navarrete RN                     Placenta    Date/Time: 10/30/2023  4:39 PM  Removal: Spontaneous  Disposition: Pathology             Anesthesia    Method: Spinal                    Presentation and Position    Presentation: Vertex    Position: Left Occiput Anterior                     Kaylah Quezada DO

## 2023-10-31 NOTE — PLAN OF CARE
Data: Patient transferred to room 428 via cart at 2040. Action: Receiving unit notified of transfer. Report given to JOCELYN Moore. Belongings sent to receiving unit. Accompanied by Registered Nurse. Oriented patient to surroundings. Call light within reach. Response: Patient tolerated transfer and is stable.

## 2023-10-31 NOTE — PROGRESS NOTES
"Sw met with pt and spouse at bedside after baby \"Nikki\" was delivered to offer social work support for their NICU stay. Family had been previously assessed while pt was on extended stay on L&D and therefore a complete assessment was not done. Please see copied note below for full assessment.    Sw offered NICU welcome letter. Pt declined regular check-ins. Sw discussed baby blues vs postpartum mood symptoms vs emergency symptoms. Sw also offered pt resources on how to connect with [facility] [place]. Pt had no other questions or concerns.  Sw remains available to pt/baby should any needs arise.    SARITHA Rose, LGCORBIN  Inpatient Care Coordination  [facility]  [phone]    Taisha Farnsworth MSW       Consults  Signed     Date of Service: 10/25/2023 10:06 AM  Creation Time: 10/25/2023  9:53 AM  Consult Orders   Care Management / Social Work IP Consult [[id number]] ordered by Christin Gardner MD at 10/25/23 0953            INITIAL SOCIAL WORK MATERNITY ASSESSMENT     DATA:      Reason for Social Work Consult: Extended Stay w/likely NICU stay      Presenting Information: Sw met with pt to discuss sw role, offer support/sw resources     Living Situation: lives with spouse, three yo, Dylan, and mother-in-law     Social Support/Professional Community Support:  pt reports that they have many family members close by and a good network of friends who have been visiting, providing food, etc     Insurance: [employer]     Source of Financial Support: 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 They have no financial concerns.     Baby Supplies: Have all supplies/will be able to get remaining supplies before baby comes home.     Mental Health History: Pt has no mental health history or PPMD history. Sw discussed the increased risk of PPMD with extended NICU stays. Sw discussed postpartum support [place] and will provide pt " with resources on PPMD after delivery.     History of Postpartum Mood Disorders: None, no concerns.     Chemical Health History: NA        INTERVENTION:      SW completed chart review and collaborated with the multidisciplinary team.   Psychosocial Assessment   Introduction to Maternal Child Health  role and scope of practice   Reviewed Hospital and Community Resources   Assessed Chemical Health History and Current Symptoms   Assessed Mental Health History and Current Symptoms   Identified stressors, barriers and family concerns   Provided support and active empathetic listening and validation.   Provided psychoeducation on  mood and anxiety disorders, assessed for any current symptoms or history     ASSESSMENT:    Sw met with pt to introduce sw role and offer support during planned extended stay. Pt reports that she feels like everything is going very well all things considered. Pt's three year old has been coping very well with the situation. Pt's family visits every day in the evening. Pt's spouse works from home and pt's parents have been providing childcare when pt's son is out of . She reports that she has a good support system and has been getting lots of visits, food, etc. from family and friends. Pt had not post partum mood concerns in her first pregnancy. Sw discussed increased risk for post partum mood disorder but will see pt to provide resources and discuss warning signs in greater depth after delivery. Pt reports that her pregnancy has been uncomplicated up to this point and that her first pregnancy was also relatively uncomplicated. 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 Pt's spouse works from home and will also have a fair amount of flexibility in his work after the baby comes home.      Pt has some concerns about her three year old being able to visit the NICU after baby is born and also discussed uncertainty related to  how long baby will need to be in the NICU. Pt offered that Child Life visit was helpful but that she did not feel she needed any additional support from them at this point. Pt could not identify any specific needs/concerns for sw at this time. Sw offered to meet with pt weekly or wait to see pt until after delivery. Pt requested to not meet with sw until after delivery. Pt is aware she can request to meet with sw at any point.              PLAN:       Sw will follow pt throughout her stay and meet with pt after delivery to provide additional support/NICU resources.      SARITHA Rose, [facility]  Inpatient Care Coordination  [facility]  [phone]

## 2023-10-31 NOTE — PROVIDER NOTIFICATION
10/30/23 1810   Provider Notification   Provider Name/Title Dr. Toth   Method of Notification Electronic Page     1745- In recover, denies pain or discomfort. 1800- Complains of sudden onset of pain in area at base of neck, given heating packs. Waiting for medication to be verified. 1810- Rates neck pain now at a 7-10, has hot packs on. Order received for Fentanyl 50 mg. Pulse 133-143 (see flow sheet. Dr. Toth paged to come to bedside. 1825- Fentanyl given, Dr. Toth at bedside. Plan per provider: repeat Fentanyl 50 mg and OK to massage base of neck. 1900- Rates pain at a 1.  at bedside.

## 2023-10-31 NOTE — CONSULTS
SPIRITUAL HEALTH SERVICES Progress Note  Postpartum      Saw pt Sushila DE LEON Hollis per verbal staff consult request for emotional support.   present.    Patient/Family Understanding of Illness and Goals of Care - Patient describes being admitted to  10/14/23 due to PPROM.  She delivered her daughter 10/30/23 via emergency .    Distress and Loss - She described fatigue and intense pain; which she said is continuing to diminish.  She also shared that it is difficult to find a comfortable position for sitting/ resting.    Strengths, Coping, and Resources - She named strong family support; and is grateful that baby has arrived and is getting the care she needs.  She is focused on pumping; glad she can support baby in this way; as they are not in the same hospital room at this time.    Meaning, Beliefs and Spirituality - Patient did not name any spiritual connections during our visit.    Plan of Care - No additional needs at this time.  [facility] remains available upon request.    Rev. Jessica Miguel M.Div.  Staff   Phone [phone]

## 2023-10-31 NOTE — LACTATION NOTE
Lactation in to see patient. Baby is 31 weeks in NICU. Discussed pumping every 3 hours, with hand expression afterwards. Educated on when to switch to maintain mode. Encouraged hands on pumping. Getting small volumes of colostrum to bring down to baby.  Discussed hospital grade pump benefits. Encouraged to call her insurance provider to check coverage of both hospital grade and personal pump. Knows to take all pump equipment home with her at discharge. Hands free pump made. Knows lactation available for questions or concerns.

## 2023-10-31 NOTE — PROGRESS NOTES
"  October 31, 2023    DAILY NOTE - POSTOP DAY 1    SUBJECTIVE: Doing okay but with sharp pains shooting into the vaginal area, intermittent but intense. Incisional pain under good control.    Pain controlled? No: see above  Tolerating a regular diet? YES  Ambulating? YES  Voiding without difficulty? No: slow to empty, have not checked residuals  Lochia? minimal  Breastfeeding:  pumping for baby in NICU        OBJECTIVE:  Vitals:    10/30/23 2245 10/30/23 2345 10/31/23 0040 10/31/23 0755   BP: 97/52 93/55 100/59 96/50   BP Location: Left arm Left arm Left arm Left arm   Patient Position: Semi-Ocffey's Semi-Coffey's Semi-Coffey's Sitting   Cuff Size: Adult Regular Adult Regular Adult Regular    Pulse: 90 87 76 68   Resp: 16 16 16 17   Temp: 98.8  F (37.1  C) 98  F (36.7  C) 98.2  F (36.8  C) 97.4  F (36.3  C)   TempSrc: Oral Oral Oral Oral   SpO2: 100% 100% 100%    Weight:           Constitutional: healthy, alert, and no distress  Abdomen:  Uterine fundus is firm, non-tender and at the level of the umbilicus   Incision: C/D/I   LE:  none edema, non-tender      LABS:  Hemoglobin   Date Value Ref Range Status   10/31/2023 12.2 11.7 - 15.7 g/dL Final   10/14/2023 15.0 11.7 - 15.7 g/dL Final     No results found for: \"RUBELLAABIGG\"   No results found for: \"ABO\"      No results found for: \"RH\"   No components found for: \"CMP\"      ASSESSMENT:   POD #1  Primary classical C/S    ?gas pain versus bladder spasms       PLAN:      Continue routine care  Ambulation encouraged  Will add pyridium, oxycodone now, simethicone.  Consider additional meds for bladder spasms if needed.  Check PVR with US and if >200, call and will consider I&O cath as well.      Anticipate discharge in 1-2 days    Usha Guardado MD     "

## 2023-10-31 NOTE — PROVIDER NOTIFICATION
10/31/23 1252   Provider Notification   Provider Name/Title Dr Guardado   Method of Notification Phone   Request Evaluate-Remote   Notification Reason Medication Request;Status Update     Updated the above MD about decrease in general pain and residual after voiding, doctor wants to repeat  bladder scan one more time and if it is less than 200 ml residual, then no need to continue scanning;  pt requested liquid ibuprofen for pain.

## 2023-10-31 NOTE — PLAN OF CARE
"Pt ambulated in room and voided wnl, stated her pain at rest between 1-2 and mostly \"hurts when getting out of bed\"; pain controlled with tylenol, ibuprofen, and oxycodone; also, pt had pyridium for lower abdominal pain as well and simethicone to help with gas pain; pt encouraged to ambulate in room, pumping and had a small amount, visiting baby in the afternoon, continue to monitor.                        "

## 2023-10-31 NOTE — PLAN OF CARE
Vital signs stable. Postpartum assessment WDL. Uterine fundus is firm and midline. Scant vaginal bleeding. Pt reporting moderate pain, prn oxycodone and pyridium given. Heat pack provided for neck pain as well as abd pain.   Incision assessment WDL and intact with adhesive strips, scant drainage with no change in marking. Up and ambulating; free of dizziness. Voiding well. Tolerating a regular diet. Pt pumping Q3h. Questions/concerns addressed.

## 2023-11-01 LAB
PATH REPORT.COMMENTS IMP SPEC: NORMAL
PATH REPORT.COMMENTS IMP SPEC: NORMAL
PATH REPORT.FINAL DX SPEC: NORMAL
PATH REPORT.GROSS SPEC: NORMAL
PATH REPORT.MICROSCOPIC SPEC OTHER STN: NORMAL
PATH REPORT.RELEVANT HX SPEC: NORMAL
PHOTO IMAGE: NORMAL

## 2023-11-01 PROCEDURE — 250N000013 HC RX MED GY IP 250 OP 250 PS 637: Performed by: OBSTETRICS & GYNECOLOGY

## 2023-11-01 PROCEDURE — 88307 TISSUE EXAM BY PATHOLOGIST: CPT | Mod: 26 | Performed by: PATHOLOGY

## 2023-11-01 PROCEDURE — 250N000013 HC RX MED GY IP 250 OP 250 PS 637: Performed by: FAMILY MEDICINE

## 2023-11-01 PROCEDURE — 120N000001 HC R&B MED SURG/OB

## 2023-11-01 RX ORDER — PHENAZOPYRIDINE HYDROCHLORIDE 100 MG/1
100 TABLET, FILM COATED ORAL 3 TIMES DAILY PRN
Status: COMPLETED | OUTPATIENT
Start: 2023-11-01 | End: 2023-11-02

## 2023-11-01 RX ADMIN — ACETAMINOPHEN 975 MG: 325 TABLET, FILM COATED ORAL at 04:18

## 2023-11-01 RX ADMIN — IBUPROFEN 600 MG: 200 SUSPENSION ORAL at 09:40

## 2023-11-01 RX ADMIN — SENNOSIDES AND DOCUSATE SODIUM 1 TABLET: 8.6; 5 TABLET ORAL at 09:40

## 2023-11-01 RX ADMIN — ACETAMINOPHEN 975 MG: 325 TABLET, FILM COATED ORAL at 14:13

## 2023-11-01 RX ADMIN — OXYCODONE HYDROCHLORIDE 5 MG: 5 TABLET ORAL at 04:18

## 2023-11-01 RX ADMIN — IBUPROFEN 600 MG: 200 SUSPENSION ORAL at 02:02

## 2023-11-01 RX ADMIN — ACETAMINOPHEN 975 MG: 325 TABLET, FILM COATED ORAL at 20:11

## 2023-11-01 RX ADMIN — IBUPROFEN 600 MG: 200 SUSPENSION ORAL at 21:29

## 2023-11-01 RX ADMIN — SENNOSIDES AND DOCUSATE SODIUM 1 TABLET: 8.6; 5 TABLET ORAL at 20:11

## 2023-11-01 RX ADMIN — OXYCODONE HYDROCHLORIDE 5 MG: 5 TABLET ORAL at 00:28

## 2023-11-01 RX ADMIN — PHENAZOPYRIDINE 100 MG: 100 TABLET ORAL at 20:11

## 2023-11-01 RX ADMIN — IBUPROFEN 600 MG: 200 SUSPENSION ORAL at 15:44

## 2023-11-01 RX ADMIN — PHENAZOPYRIDINE 100 MG: 100 TABLET ORAL at 11:58

## 2023-11-01 RX ADMIN — PHENAZOPYRIDINE 100 MG: 100 TABLET ORAL at 00:31

## 2023-11-01 RX ADMIN — OXYCODONE HYDROCHLORIDE 5 MG: 5 TABLET ORAL at 18:37

## 2023-11-01 RX ADMIN — OXYCODONE HYDROCHLORIDE 5 MG: 5 TABLET ORAL at 09:44

## 2023-11-01 RX ADMIN — OXYCODONE HYDROCHLORIDE 5 MG: 5 TABLET ORAL at 14:06

## 2023-11-01 ASSESSMENT — ACTIVITIES OF DAILY LIVING (ADL)
ADLS_ACUITY_SCORE: 20

## 2023-11-01 NOTE — PLAN OF CARE
Vital signs stable. Postpartum assessment WDL. Uterine fundus is firm and midline. Scant vaginal bleeding. Using Tylenol, Ibuprofen, Oxycodone, and Pyridium during shift for pain management. Pt reporting moderate pain throughout shift. Heat pack utilized as well.  Incision assessment WDL, no change in drainage amount. Up and ambulating; free of dizziness. Voiding w/o difficulty. Tolerating a regular diet. Pumping Q3h. Questions/concerns addressed.

## 2023-11-01 NOTE — ANESTHESIA POSTPROCEDURE EVALUATION
Patient: Sushila Shafer    Procedure: Procedure(s):   section       Anesthesia Type:  Spinal    Note:  Disposition: Inpatient   Postop Pain Control: Uneventful            Sign Out: Well controlled pain   PONV: No   Neuro/Psych: Uneventful            Sign Out: Acceptable/Baseline neuro status   Airway/Respiratory: Uneventful            Sign Out: Acceptable/Baseline resp. status   CV/Hemodynamics: Uneventful            Sign Out: Acceptable CV status; No obvious hypovolemia; No obvious fluid overload   Other NRE:    DID A NON-ROUTINE EVENT OCCUR? No           Last vitals:  Vitals Value Taken Time   /99 10/30/23 1849   Temp     Pulse     Resp     SpO2 96 % 10/30/23 1857   Vitals shown include unfiled device data.    Electronically Signed By: Alda Kline MD  2023  8:42 PM

## 2023-11-01 NOTE — PLAN OF CARE
Data: Vital signs within normal limits. Postpartum checks within normal limits - see flow record. Patient eating and drinking normally. Patient able to empty bladder independently and is up ambulating. No apparent signs of infection. Patient performing self cares, and is pumping every 3 hours.  Incision appears within normal. Is using Tylenol, Ibuprofen, Oxycodone, and Pyridium for moderate discomfort.   Action: Patient medicated during the shift for bladder spasms, pain and cramping. See MAR. Patient education done, see flow record.  Response: NICU iPad in room.  Plan: Continue current plan of care.  Anticipate discharge on 11/2/23.

## 2023-11-01 NOTE — PROGRESS NOTES
[facility] Post-Op / Progress Note         Assessment and Plan:    Assessment:   Post-operative day #2  Low transverse primary  section  L&D complications: PPROM on 10/14  Non reassuring fetal status       Doing well.  Clean wound without signs of infection.  No excessive bleeding  Pain well-controlled.  Hgb 12.2      Plan:   Ambulation encouraged  Monitor wound for signs of infection  Pain control measures as needed  Reportable signs and symptoms dicussed with the patient  Anticipate discharge tomorrow           Interval History:   Doing well.  Pain is well-controlled.  No fevers.  No history of wound drainage, warmth or significant erythema.  Good appetite.  Denies chest pain, shortness of breath, nausea or vomiting.  Ambulatory.  Breastfeeding well.          Significant Problems:      Past Medical History:   Diagnosis Date    Ocular migraine              Review of Systems:    The patient denies any chest pain, shortness of breath, excessive pain, fever, chills, purulent drainage from the wound, nausea or vomiting.          Medications:   All medications related to the patient's surgery have been reviewed          Physical Exam:   All vitals stable  Patient Vitals for the past 24 hrs:   BP Temp Temp src Pulse Resp   23 0028 103/64 97.6  F (36.4  C) Oral 76 14   10/31/23 1645 94/55 97.8  F (36.6  C) Oral 84 16   10/31/23 1245 101/59 97.4  F (36.3  C) Oral 83 16     Wound clean and dry with minimal or no drainage.  Surrounding skin with minimal erythema.  Ext NT          Data:     Hemoglobin   Date Value Ref Range Status   10/31/2023 12.2 11.7 - 15.7 g/dL Final   10/14/2023 15.0 11.7 - 15.7 g/dL Final     No imaging studies have been ordered    Camilo Lamar MD  2023 9:30 AM

## 2023-11-01 NOTE — PROGRESS NOTES
[facility] Nurse (PHN) in to see pt to discuss [facility] programs. Patient is not interested in referral for a nurse visit at this time but will reach out to [facility] if interested in scheduling a nurse visit. PHN discussed [facility] community resource guide and rack cards and left these resources with patient. Baby is in NICU and PHN discussed with patient that she can speak with one of the PHNs in the NICU on Tuesdays if she has questions as baby gets closer to discharge as well. Patient receptive to plan.

## 2023-11-01 NOTE — PROVIDER NOTIFICATION
11/01/23 0426   Provider Notification   Provider Name/Title Dr. Guardado   Method of Notification Electronic Page   Request Evaluate-Remote   Notification Reason Medication Request       Patient requesting to continue Pyridium for bladder spasms but medication has been completed. Can we reorder and continue?    MD agreed to reorder the order for Pyridium 100mg orally TID an additional three doses today.

## 2023-11-02 VITALS
OXYGEN SATURATION: 100 % | HEART RATE: 83 BPM | RESPIRATION RATE: 16 BRPM | SYSTOLIC BLOOD PRESSURE: 111 MMHG | TEMPERATURE: 97.4 F | WEIGHT: 150.1 LBS | DIASTOLIC BLOOD PRESSURE: 62 MMHG | BODY MASS INDEX: 20.36 KG/M2

## 2023-11-02 LAB
ALBUMIN UR-MCNC: 30 MG/DL
APPEARANCE UR: CLEAR
BILIRUB UR QL STRIP: ABNORMAL
COLOR UR AUTO: ABNORMAL
GLUCOSE UR STRIP-MCNC: NEGATIVE MG/DL
HGB UR QL STRIP: NEGATIVE
KETONES UR STRIP-MCNC: NEGATIVE MG/DL
LEUKOCYTE ESTERASE UR QL STRIP: NEGATIVE
MUCOUS THREADS #/AREA URNS LPF: PRESENT /LPF
NITRATE UR QL: POSITIVE
PH UR STRIP: 5.5 [PH] (ref 5–7)
RBC URINE: 1 /HPF
SP GR UR STRIP: 1.03 (ref 1–1.03)
SQUAMOUS EPITHELIAL: 7 /HPF
UROBILINOGEN UR STRIP-MCNC: 2 MG/DL
WBC URINE: 2 /HPF

## 2023-11-02 PROCEDURE — 250N000013 HC RX MED GY IP 250 OP 250 PS 637: Performed by: FAMILY MEDICINE

## 2023-11-02 PROCEDURE — 999N000079 HC STATISTIC IP LACTATION SERVICES 1-15 MIN

## 2023-11-02 PROCEDURE — 81001 URINALYSIS AUTO W/SCOPE: CPT | Performed by: FAMILY MEDICINE

## 2023-11-02 PROCEDURE — 87086 URINE CULTURE/COLONY COUNT: CPT | Performed by: FAMILY MEDICINE

## 2023-11-02 PROCEDURE — 250N000013 HC RX MED GY IP 250 OP 250 PS 637: Performed by: OBSTETRICS & GYNECOLOGY

## 2023-11-02 RX ORDER — PHENAZOPYRIDINE HYDROCHLORIDE 200 MG/1
200 TABLET, FILM COATED ORAL 3 TIMES DAILY PRN
Qty: 6 TABLET | Refills: 0 | Status: SHIPPED | OUTPATIENT
Start: 2023-11-02 | End: 2023-11-05

## 2023-11-02 RX ORDER — AMOXICILLIN 250 MG
2 CAPSULE ORAL 2 TIMES DAILY
Qty: 30 TABLET | Refills: 0 | Status: SHIPPED | OUTPATIENT
Start: 2023-11-02

## 2023-11-02 RX ORDER — OXYCODONE HYDROCHLORIDE 5 MG/1
5 TABLET ORAL EVERY 4 HOURS PRN
Qty: 10 TABLET | Refills: 0 | Status: SHIPPED | OUTPATIENT
Start: 2023-11-02

## 2023-11-02 RX ADMIN — ACETAMINOPHEN 975 MG: 325 TABLET, FILM COATED ORAL at 09:17

## 2023-11-02 RX ADMIN — IBUPROFEN 600 MG: 200 SUSPENSION ORAL at 09:17

## 2023-11-02 RX ADMIN — SENNOSIDES AND DOCUSATE SODIUM 2 TABLET: 8.6; 5 TABLET ORAL at 09:17

## 2023-11-02 RX ADMIN — SIMETHICONE 80 MG: 80 TABLET, CHEWABLE ORAL at 09:23

## 2023-11-02 RX ADMIN — IBUPROFEN 600 MG: 200 SUSPENSION ORAL at 03:29

## 2023-11-02 RX ADMIN — PHENAZOPYRIDINE 100 MG: 100 TABLET ORAL at 03:41

## 2023-11-02 RX ADMIN — ACETAMINOPHEN 975 MG: 325 TABLET, FILM COATED ORAL at 03:29

## 2023-11-02 ASSESSMENT — ACTIVITIES OF DAILY LIVING (ADL)
ADLS_ACUITY_SCORE: 20

## 2023-11-02 NOTE — PROGRESS NOTES
Data: Vital signs within normal limits. Postpartum checks within normal limits - see flow record. Patient eating and drinking normally. Patient able to empty bladder independently and is up ambulating. No apparent signs of infection. Incision healing well. Patient performing self cares and is able to wheelchair to NICU to see infant.  Action: Patient medicated during the shift for pain. See MAR. Patient reassessed within 1 hour after each medication and pain was improved - patient stated she was comfortable. Patient education done about constipation, pain control, ambulation/fluid intake. See flow record.  Response: Positive attachment behaviors observed through iPad and discussion as infant is in NICU. Support persons  and son present.   Plan: Anticipate discharge on 11/2.

## 2023-11-02 NOTE — PLAN OF CARE
Vital signs within normal limits. Postpartum checks within normal limits - see flow record. Patient eating and drinking normally. Patient able to empty bladder independently and is up ambulating. Patient performing self cares, and is pumping for baby in NICU. Incision WDL with steri strips covering. Patient medicated with ibuprofen and tylenol during the shift for pain. See MAR. Adequate pain control noted by patient. Patient education done, see flow record. Positive attachment behaviors observed with infant. Patient's spouse present this shift.     Discharge instructions completed.  Patient states she understands all discharge instructions and all her questions have been answered.  Verbalizes when she needs to return to clinic for follow up.  She is caring for herself independently.  Prescriptions reviewed and sent to pharmacy.  Postpartum depression symptoms reviewed and encouraged frequent review of depression scale. Patient discharged home with family transporting at 1415.

## 2023-11-02 NOTE — DISCHARGE SUMMARY
[facility] Discharge Summary    Sushila Shafer MRN# [id number]   Age: 33 year old YOB: 1990     Date of Admission:  10/14/2023  Date of Discharge::  2023  Admitting Physician:  Rajiv Gardner MD  Discharge Physician:  Kaylah Quezada DO     Home clinic: [facility]          Admission Diagnoses:    premature rupture of membranes (PPROM) with unknown onset of labor [O42.919]          Discharge Diagnosis:     32 weeks gestation of pregnancy [Z3A.32]  Intrauterine pregnancy at 31w1d  weeks gestation  S/p  section for bpp 2/10          Procedures:     Procedure(s): Primary low transverse  section       No other procedures performed during this admission           Medications Prior to Admission:     Medications Prior to Admission   Medication Sig Dispense Refill Last Dose    Calcium-Phosphorus-Vitamin D (CALCIUM/D3 ADULT GUMMIES PO) Take 2 chew tab by mouth daily Vitafusion calcium gummies       Multiple Vitamins-Minerals (MULTIVITAMIN WOMEN PO) Take 2 tablets by mouth daily One-A-Day women's petite       Omega-3 Fatty Acids (FISH OIL PO) Take 1,650 mg by mouth daily       Prenatal Vit-Fe Fumarate-FA (PNV PRENATAL PLUS MULTIVITAMIN) 27-1 MG TABS per tablet Take 1 tablet by mouth daily   10/13/2023    Probiotic Product (PROBIOTIC DAILY PO) Take 1 capsule by mouth daily Women's Care probiotic                Discharge Medications:     Current Discharge Medication List        START taking these medications    Details   oxyCODONE (ROXICODONE) 5 MG tablet Take 1 tablet (5 mg) by mouth every 4 hours as needed for breakthrough pain  Qty: 10 tablet, Refills: 0    Associated Diagnoses: S/P  section      phenazopyridine (PYRIDIUM) 200 MG tablet Take 1 tablet (200 mg) by mouth 3 times daily as needed for irritation  Qty: 6 tablet, Refills: 0    Associated Diagnoses: S/P  section      senna-docusate (SENOKOT-S/PERICOLACE) 8.6-50  MG tablet Take 2 tablets by mouth 2 times daily  Qty: 30 tablet, Refills: 0    Associated Diagnoses: S/P  section           CONTINUE these medications which have NOT CHANGED    Details   Calcium-Phosphorus-Vitamin D (CALCIUM/D3 ADULT GUMMIES PO) Take 2 chew tab by mouth daily Vitafusion calcium gummies      Multiple Vitamins-Minerals (MULTIVITAMIN WOMEN PO) Take 2 tablets by mouth daily One-A-Day women's petite      Omega-3 Fatty Acids (FISH OIL PO) Take 1,650 mg by mouth daily      Prenatal Vit-Fe Fumarate-FA (PNV PRENATAL PLUS MULTIVITAMIN) 27-1 MG TABS per tablet Take 1 tablet by mouth daily      Probiotic Product (PROBIOTIC DAILY PO) Take 1 capsule by mouth daily Women's Care probiotic                   Consultations:   Nicu consultation          Brief History of Labor or Admission:   32 y/o  admitted with PPROM @ 28 6/7 weeks, treated with latency antibiotics, followed by MFM with bpp and NST, s/p BMZ.  Underwent Primary classical  section for bpp 2/10.            Hospital Course:   The patient's hospital course was unremarkable.  She recovered as anticipated and experienced no post-operative complications.  On discharge, her pain was well controlled. Vaginal bleeding is similar to peak menstrual flow.  Voiding without difficulty.  Ambulating well and tolerating a normal diet.  No fever or significant wound drainage.  Pumping well.  Infant is stable.  No bowel movement yet.  She was discharged on post-partum day #3.    Post-partum hemoglobin:   Hemoglobin   Date Value Ref Range Status   10/31/2023 12.2 11.7 - 15.7 g/dL Final     /62 (BP Location: Right arm, Patient Position: Semi-Coffey's, Cuff Size: Adult Regular)   Pulse 83   Temp 97.4  F (36.3  C) (Oral)   Resp 16   Wt 68.1 kg (150 lb 1.6 oz)   LMP 2023   SpO2 100%   Breastfeeding Unknown   BMI 20.36 kg/m     Gen AAO x 3   Abdomen soft, incision c/d/i          Discharge Instructions and Follow-Up:     Discharge  diet: Regular   Discharge activity: No heavy lifting, pushing, pulling for 6 week(s)  No driving for 6 week(s)   Discharge follow-up: Follow up with [facility] in 6 weeks   Wound care: Drink plenty of fluids  Ice to area for comfort  Keep wound clean and dry           Discharge Disposition:     Discharged to home      Attestation:  I have reviewed today's vital signs, notes, medications, labs and imaging.    Kaylah Quezada, DO

## 2023-11-02 NOTE — LACTATION NOTE
Lactation in to see patient. Sushila getting large volumes pumping. Reviewed mastitis signs. Encouraged pumping every three hours. Plan for hospital grade pump for home. Knows to take all equipment home for pump with her. Switched patient to maintain mode. All questions answered.

## 2023-11-02 NOTE — PLAN OF CARE
VSS. Up ad sudheer. Voiding without difficulty. Incision WDL. No signs of infection. Lochia scant, no clots. Fundus firm and midline. Pain managed with Tylenol and Ibuprofen. Heat used as well. Pumping for infant in NICU. Ipad in room. Bonding well with infant.

## 2023-11-03 ENCOUNTER — NURSE TRIAGE (OUTPATIENT)
Dept: NURSING | Facility: CLINIC | Age: 33
End: 2023-11-03

## 2023-11-03 LAB — BACTERIA UR CULT: NORMAL

## 2023-11-03 NOTE — TELEPHONE ENCOUNTER
"Pt's  Yan reports pt had a  10/30/23, no complications. Per Yan today, pt \"slept for few hours on couch, getting up a couple of times, doing ok, got up last time, body hurting abnormally, very cold, started shaking\". Pt felt \"a lot of weakness in her whole body, has been shaking for ten minutes\". Per Yan \"incision looks ok\". Pt had \"a spinal\" for procedure. Pt reports pain \"was in hands and incision area\". Pt denies pain at time of call. Oral temperature 100.5 just before calling. Pt has had Tylenol and ibuprofen today, last had Tylenol at 2 pm \"650 mg\". Yan denies pt has erythema, bleeding or drainage of incision. At time of call oral temperature 102.3 per Yan. Pt continues to feel very weak at time of call per Yan.     Writer advised Yan to hang up and dial 911. Have pt lie down and elevate legs while waiting for paramedics.     Yan verbalizes understanding and agrees to plan.       Reason for Disposition   Pain or burning with passing urine (urination)   Shock suspected (e.g., cold/pale/clammy skin, too weak to stand, low BP, rapid pulse)    Additional Information   Negative: Sounds like a life-threatening emergency to the triager   Negative: Chest pain   Negative: Difficulty breathing   Negative: Abdominal pain (not incision pain) is main symptom   Negative: Headache is main symptom   Negative: Surgical incision symptoms and questions    Protocols used: Postpartum -  Symptoms-A-AH, Postpartum - Urination Pain-A-AH    "

## 2023-11-17 ENCOUNTER — LACTATION ENCOUNTER (OUTPATIENT)
Age: 33
End: 2023-11-17

## 2023-11-17 NOTE — LACTATION NOTE
This note was copied from a baby's chart.  Lactation visit with Sushila. Patient still early and not nursing yet, but has nuzzled at breast. Patient pumping large volumes of milk. Reviewed storage guidelines. Has had a clogged duct, reviewed light massage after pumping, using hands on pumping and breast baths. Will watch for any signs of mastitis, and bra. Knows to call for any questions.

## 2023-11-21 ENCOUNTER — LACTATION ENCOUNTER (OUTPATIENT)
Age: 33
End: 2023-11-21

## 2023-11-21 NOTE — LACTATION NOTE
This note was copied from a baby's chart.  Writer in to see Sushila and Mimarcos with a feed. Baby very alert. Latched without shield. Took few short bursts of sucks, no swallows heard with writer in room. Discussed normal for age, second time baby getting to breast. No milk transfer per scale. Hand expressions shown. Will follow up.

## 2023-11-22 ENCOUNTER — LACTATION ENCOUNTER (OUTPATIENT)
Age: 33
End: 2023-11-22

## 2023-11-22 NOTE — LACTATION NOTE
This note was copied from a baby's chart.  Lactation Follow-up: Sushila was assisted in getting set-up to breastfeed infant. Sushila brought in her support pillow and writer assisted her with infant placement in the football hold with a size small shield. Infant latched with assistance. Infant had multiple swallows noted with stimulation and gained weight after feeding. Infant tolerated feeding for about 15 mins then was fatigued.

## 2023-11-23 ENCOUNTER — LACTATION ENCOUNTER (OUTPATIENT)
Age: 33
End: 2023-11-23

## 2023-11-23 NOTE — LACTATION NOTE
This note was copied from a baby's chart.  Lactation in to see Sushila and baby Jeb. Praised patient for good feed with milk transfer yesterday. Using a shield for feeds. Discussed positives of using at this time. Jeb sleepy in bassinet. Not interested in eating after brought to the breast. Encouraged STS for next feed. Knows to call for assistance. Jessica to follow up tomorrow.

## 2023-11-24 ENCOUNTER — LACTATION ENCOUNTER (OUTPATIENT)
Age: 33
End: 2023-11-24

## 2023-11-24 NOTE — LACTATION NOTE
This note was copied from a baby's chart.  Lactation visit for latch assist.  Infant CGA 34.5 weeks.  Sushila reporting Nikki breastfeeding once per day.  Assisted with bringing Nikki to right breast in football hold.  Hand expression done prior to latch and milk in shield.  Nikki started with suckling/licking nipple first and then took couple of short bursts of non-nutritive sucks to start.  Discussed potential  feeding behaviors and familiarizing at breast.  Nikki did move into a 2-4 nutritive suck pattern and did well at pacing herself. Reviewed pacing education with mother.  Nikki continued 2-4 sucks with pacing for few minutes and then started to fatigue- at end of feed had brief bradycardia 88-90's that self corrected- writer reported to bedside RN. Per scale Nikki transferred 16 ml at breast.  Praise provided.  Bedside RN updated on visit.

## 2023-11-25 ENCOUNTER — LACTATION ENCOUNTER (OUTPATIENT)
Age: 33
End: 2023-11-25

## 2023-11-25 NOTE — LACTATION NOTE
This note was copied from a baby's chart.  Lactation visit for breastfeed- Assisted with hand expression and getting milk in shield prior to latch.  Sushila latched Nikki to right breast in football hold with shield.  Nikki slow to start sucking but did move into a 3-4 nutritive suck pattern with swallows heard.  Nikki did self pacing of 3-4 sucks. Fatigued after 4-5 minutes- brief desat at end of feed 88%-91% but self resolved quickly - bedside RN notified.  Per scale Nikki transferred 10 ml.  Praise provided. All questions answered and bedside RN updated on visit.

## 2023-11-26 ENCOUNTER — LACTATION ENCOUNTER (OUTPATIENT)
Age: 33
End: 2023-11-26

## 2023-11-26 NOTE — LACTATION NOTE
This note was copied from a baby's chart.  Lactation visit for 1300 breastfeed.  Nikki STS- hand expression used prior to latch- milk in shield.  Nikki brought to left breast in cross cradle hold- latched with wide mouth and flanged lips.  Suckled at breast and then unable to stimulate to start suck pattern.  Nikki still alert and cueing so switched to football hold on left breast.  Nikki did have 2 bursts of 3-4 sucks with few swallows heard but then stopped sucking and un latching.  Unable to stimulate to suck again- and writer noted pillows and back of diaper were very wet.  Scale showed a slight decrease in weight.  Reported to bedside RN that Nikki had void that had leaked out of diaper.  Sushila doing STS during tube feeding.  Support and encouragement provided and reviewed benefits of practicing at breast even if didn't transfer milk.  Bedside RN update of visit.

## 2023-11-27 ENCOUNTER — LACTATION ENCOUNTER (OUTPATIENT)
Age: 33
End: 2023-11-27

## 2023-11-27 NOTE — LACTATION NOTE
This note was copied from a baby's chart.  Lactation visit with Nikki. Baby awake doing STS before feed. Latched well with shield. Had a few bursts of sucks with few swallows. Switched from football to cross cradle to see if change will illicit a nutritive suck pattern, milk placed under shield. Baby not interested. Scale showed no volume transfer. Raise given for attempt. Will continue to follow.

## 2023-11-28 ENCOUNTER — LACTATION ENCOUNTER (OUTPATIENT)
Age: 33
End: 2023-11-28

## 2023-11-28 NOTE — LACTATION NOTE
This note was copied from a baby's chart.  Sushila was concerned because infant struggled at the breast the last two feeds. Encourage mother and reminded her Nikki is just learning and will have good and bad days at the breast at this age. Infant was brought to breast with the shield in the football hold. Infant had several good nutritive suck patterns and swallows heard. Nikki repeated this cycle 3 times and tolerated the feeding for 12 mins. Bedside nurse updated. Infant had a volume increase  of 6. Praised mother and answered questions about frequency of pumping over night. Encouraged her to continue to pump every 3 hours to stay on the infants schedule.

## 2023-11-29 ENCOUNTER — LACTATION ENCOUNTER (OUTPATIENT)
Age: 33
End: 2023-11-29

## 2023-11-29 NOTE — LACTATION NOTE
This note was copied from a baby's chart.  Lactation visit with Jbe. Baby doing STS before latch. Baby transferred 6 mls at breast per scale. Baby slow to start. Attempted football and cross cradle, before baby latched in cross cradle. Praise given. Mother discouraged with volume. Discussed normal behavior and baby is doing well. Will continue to follow.

## 2023-12-01 ENCOUNTER — LACTATION ENCOUNTER (OUTPATIENT)
Age: 33
End: 2023-12-01

## 2023-12-02 NOTE — LACTATION NOTE
This note was copied from a baby's chart.  Lactation visit. Mielle at the breast. Active with good swallows. Transfer of 8 on the scale, praise given. Right breast has a clogged duct. Discussed heat, massage, and ice post pump. Encouraged to use motrin. Reviewed signs and symptoms of mastitis. Discussed wanting clog to move before 48 hours. Reminded patient about breast bath to help with getting milk moving. Will continue to follow up.

## 2023-12-03 ENCOUNTER — LACTATION ENCOUNTER (OUTPATIENT)
Age: 33
End: 2023-12-03

## 2023-12-04 ENCOUNTER — LACTATION ENCOUNTER (OUTPATIENT)
Age: 33
End: 2023-12-04

## 2023-12-04 NOTE — LACTATION NOTE
This note was copied from a baby's chart.  Lactation visit with Nikki. Baby alert but not consistent with sucking. No swallows heard. Transfer of zero per scale. Baby does need long breaks to recover after suck string. Encouraged Sushila to not get discouraged. Will follow up.

## 2023-12-04 NOTE — LACTATION NOTE
This note was copied from a baby's chart.  Lactation visit with Sushila and baby girl Nikki. Sushila wanting to skip STS before nursing as she feels Nikki gets too comfortable and makes for a sleepier feed. Baby brought to the breast. Did start with a good burst of sucks. Nikki takes long breaks to recover and catch her breath and needing lot of prompting to stay active at breast. Did transfer 8 mls per scale.   Kacie dealing with clog duct on her right inner and has seen a decrease in her milk supply. Reviewed ibuprofen, heat, ice, and gentle massage. Discussed importance of moving clog. No complaint of mastitis symptoms.

## 2023-12-05 ENCOUNTER — LACTATION ENCOUNTER (OUTPATIENT)
Age: 33
End: 2023-12-05

## 2023-12-05 NOTE — LACTATION NOTE
"This note was copied from a baby's chart.  Lactation visit with breastfeed.  Nikki cueing and eager to latch- took 2-3 short bursts of sucks and then needing to catch breath- did not note any desats but increased resp rate briefly after sucks. Nikki taking long pause and then had another 2-3 bursts of sucks- one swallow noted but unable to stimulate to continue suck pattern and started getting sleepy with stimulation.  Pre and post feed weights indicate no transfer.  Reinforced importance of practice feeds and pre term feeding patterns.    Sushila reporting \"clogged duct\" on right inner breast has improved.  Reports she slept through alarm and then she feels clog but was able to work it out- reviewed clog education.  All questions answered and bedside RN updated.   "

## 2023-12-07 ENCOUNTER — LACTATION ENCOUNTER (OUTPATIENT)
Age: 33
End: 2023-12-07

## 2023-12-08 NOTE — LACTATION NOTE
This note was copied from a baby's chart.  Lactation in to visit with Nikki with her 1300 feed. Nikki alert. Gets to breast did initial sucking right away and needed time to recover. Few swallows 2 ml transfer per scale. Discussed trying some breastfeeding with tube feeds  as mother states she gets a second wind at that time. Calories will be decreased, continue to watch for feeding cues-hoping Nikki will be hungry and more eager to nurse. Talked about not wanting breastfeeding to be a negative experience for her. Praise given despite Sushila's potential disappointment with feeds being slower. Encouraged continued massage to prevent clog ducts from building.

## 2023-12-10 ENCOUNTER — LACTATION ENCOUNTER (OUTPATIENT)
Age: 33
End: 2023-12-10

## 2023-12-10 NOTE — LACTATION NOTE
"This note was copied from a baby's chart.  Lactation visit for breastfeeding and Sushila reporting some decrease in supply by (20-30 ml) during this last week.  When questioned about any changes in last week Sushila reporting that she had brief sore throat last week but no other illness and no fever and that it did not effect pump schedule.  Denies engorgement but states she has been taking longer stretch of sleep at night consistently (5-6 hours) and will wake up with \"clog\" on inner side of right breast but that it disappears after pumping.  Discussed possibility that consistently taking a 5-6 hour stretch without pumping may be causing a decrease in supply.  Sushila reports still pumping roughly 1050 mls per 24 hours- discussed that is appropriate volume. Sushila asking if she can eat any foods to increase volume- discussed general guidelines and stimulation would be more important if wanting to increase volume. Encouraged to continue to log pump volumes.    Sushila independent with latching Nikki to left breast in cross cradle with shield.  Nikki sleepy this feed but did take few short bursts of sucks with couple of swallows heard.  Still appears to have increased work of breathing after 2-4 sucks- brief tachypnea- reported to bedside RN.  No desats noted.  Nikki fatigued quickly.  Scale indicated transfer of 2 ml. Support and encouragement provided. Reinforced education on reading Nikki's cues at breast.   "

## 2023-12-12 ENCOUNTER — LACTATION ENCOUNTER (OUTPATIENT)
Age: 33
End: 2023-12-12

## 2023-12-13 ENCOUNTER — LACTATION ENCOUNTER (OUTPATIENT)
Age: 33
End: 2023-12-13

## 2023-12-13 ENCOUNTER — PRENATAL OFFICE VISIT (OUTPATIENT)
Dept: OBGYN | Facility: CLINIC | Age: 33
End: 2023-12-13
Payer: COMMERCIAL

## 2023-12-13 VITALS — WEIGHT: 143 LBS | DIASTOLIC BLOOD PRESSURE: 58 MMHG | SYSTOLIC BLOOD PRESSURE: 92 MMHG | BODY MASS INDEX: 19.39 KG/M2

## 2023-12-13 DIAGNOSIS — Z98.891 HISTORY OF CLASSICAL CESAREAN SECTION: Primary | ICD-10-CM

## 2023-12-13 DIAGNOSIS — Z87.59 HISTORY OF PRETERM PREMATURE RUPTURE OF MEMBRANES (PPROM): ICD-10-CM

## 2023-12-13 DIAGNOSIS — Z30.011 ENCOUNTER FOR INITIAL PRESCRIPTION OF CONTRACEPTIVE PILLS: ICD-10-CM

## 2023-12-13 DIAGNOSIS — N96 RECURRENT PREGNANCY LOSS: ICD-10-CM

## 2023-12-13 PROCEDURE — 99207 PR POST PARTUM EXAM: CPT | Performed by: STUDENT IN AN ORGANIZED HEALTH CARE EDUCATION/TRAINING PROGRAM

## 2023-12-13 RX ORDER — ACETAMINOPHEN AND CODEINE PHOSPHATE 120; 12 MG/5ML; MG/5ML
0.35 SOLUTION ORAL DAILY
Qty: 112 TABLET | Refills: 3 | Status: SHIPPED | OUTPATIENT
Start: 2023-12-13 | End: 2024-04-22

## 2023-12-13 ASSESSMENT — PATIENT HEALTH QUESTIONNAIRE - PHQ9: SUM OF ALL RESPONSES TO PHQ QUESTIONS 1-9: 2

## 2023-12-13 NOTE — LACTATION NOTE
This note was copied from a baby's chart.  Lactation visit with Nikki. Baby not interested in being at the breast this feed. Keeping tongue on roof of mouth. Opens mouth slightly with no seal made. Encouraged to stop to not stress baby. Will do STS while baby getting tube fed. Patient getting disappointed. Praise given for taking more volumes via bottle. Discussed bottle feeding and working on breastfeeding at home. Small dip in milk supply, did get her menstrual cycle back-talked about how that can affect milk supply. Talked about doing a power pump a day for the next few days to potential increase supply. Will continue to assist.

## 2023-12-13 NOTE — PROGRESS NOTES
[facility]  Post-Partum Visit    CC: Routine Postpartum visit    HPI:   Sushila Shafer is a 33 year old  who is postpartum from a  delivery on 10/30/23 at 31w1d.    Sushila was admitted at 29w5d with PPROM. She received BMZ and latency antibiotics. She was delivered at 31w1d in the setting of a cat 2 tracing with 2/10 BPP. Delivery was via classical  section. Postpartum course was unremarkable. She was discharged on POD#3. Pregnancy otherwise remarkable for RPL (negative APS testing) and Rh neg. Baby is still in the NICU working on feeding but doing very well per Sushila.    Today Sushila is feeling well. She has resumed normal daily acitvities, bladder and bowel habits, sleep patterns. She has not resumed sexual activity. She is currently pumping and breastfeeding. She would like to restart POP for BC. She resumed menses yesterday. Declines pelvic exam.    Medical History  Past Medical History:   Diagnosis Date    Ocular migraine        Surgical History:  Past Surgical History:   Procedure Laterality Date     SECTION N/A 10/30/2023    Procedure:  section;  Surgeon: Kaylah Quezada DO;  Location: [facility] OR    MAMMOPLASTY AUGMENTATION         Medications:   Current Outpatient Medications   Medication    Calcium-Phosphorus-Vitamin D (CALCIUM/D3 ADULT GUMMIES PO)    Omega-3 Fatty Acids (FISH OIL PO)    Prenatal Vit-Fe Fumarate-FA (PNV PRENATAL PLUS MULTIVITAMIN) 27-1 MG TABS per tablet    Probiotic Product (PROBIOTIC DAILY PO)    Multiple Vitamins-Minerals (MULTIVITAMIN WOMEN PO)    oxyCODONE (ROXICODONE) 5 MG tablet    senna-docusate (SENOKOT-S/PERICOLACE) 8.6-50 MG tablet     No current facility-administered medications for this visit.       Allergies:   Allergies   Allergen Reactions    Azithromycin Hives, Itching and Rash     Other reaction(s): Itching, Pruritus      Lactose Other (See Comments)    Penicillins Rash       Family History:   Family  History   Problem Relation Age of Onset    No Known Problems Mother     No Known Problems Father        Social History:   Social History     Tobacco Use    Smoking status: Never    Smokeless tobacco: Never   Vaping Use    Vaping Use: Never used   Substance Use Topics    Alcohol use: Not Currently    Drug use: Never       Review of Systems:  A 10 point review of systems was negative except as noted in HPI    Physical Examination:  BP 92/58   Wt 64.9 kg (143 lb)   LMP 2023   Breastfeeding Yes   BMI 19.39 kg/m    Body mass index is 19.39 kg/m .  GEN - NAD, comfortable; appears stated age  NEURO - grossly non-focal; PERRL, EOMI  HEENT - Atraumatic, normocephalic; oral MMM, oropharynx clear  LUNG - CTAB, no wheezing/rhonchi/crackles; non-labored breathing  CV- RRR, nl S1/S2, no murmurs/clicks/gallops  ABD - Soft, non-tender, non-distended; pfannenstiel c/d/i  EXT - no edema, nontender  PELVIC - Declined    Assessment/Plan:   33 year old  here for routine postpartum visit. Pregnancy notable for PPROM, delivery by classical  at 31w1d, and RPL.    1. Doing well  2. Baby: In NICU, doing well. Expected discharge by xmas  3. Breast feeding  4. Mood: good  5. Birth control: POP rx today (history of migrane w/ aura, not a candidate for estrogen based OCP)  6. Hx classical  section - reviewed waiting 18 months between pregnancy, need for delivery via repeat   7. RPL - APS testing negative.    Krishan Chandra MD MPH  2023 3:57 PM

## 2023-12-13 NOTE — LACTATION NOTE
This note was copied from a baby's chart.  Lactation in room for breastfeed.  Nikki alert and cueing. Brought to left breast in cross cradle with few short bursts of sucks initially but having some difficulty sustaining latch.  Switched to football hold and eager to latch again.  Nikki would take frequent pauses but then moved into active suck/swallow pattern.  Nikki paced herself and had less tachypnea noted with this breastfeed then with previous breastfeeds.  Bedside RN present and reports Nikki taking higher volumes with bottles and also noted less tachypnea.  Per scale Nikki transferred 12 ml at breast over approximately 20 minutes. Praise provided. Reinforced reading Nikki's cues at breast.

## 2023-12-14 ENCOUNTER — LACTATION ENCOUNTER (OUTPATIENT)
Age: 33
End: 2023-12-14

## 2023-12-14 NOTE — LACTATION NOTE
This note was copied from a baby's chart.  Lactation visit with Nikki. Nikki awake with feed. Latched well on to shield cross cradle and started nursing right away. Able to take adequate breaks and resumed nursing. Baby consistent with sucking this feed. Needing some tactile stimulation at end of feed to stay consistent. Per scale baby transferred 44 mls. Praise given. Sushila feeling like her supply is starting to do back up after a dip. Power pumping reviewed.

## 2023-12-15 ENCOUNTER — LACTATION ENCOUNTER (OUTPATIENT)
Age: 33
End: 2023-12-15

## 2023-12-15 NOTE — LACTATION NOTE
This note was copied from a baby's chart.  Lactation in to see Nikki with feed. Baby cueing at time of visit. Brought to the breast. Latched well in cross cradle on left breast. Suggested that left side due to mother having a clog duct. Moved to a nutritive suck pattern quickly. Doing a better job taking breaks needing some tactile stimulation to start sucking again at times. Swallows heard. Baby doing well transfer of 8 mls per scale. Praise given. Sushila has a clog duct on the left side, potentially why volume taken lower than yesterday. Sushila has a dip in volume since clog. Did sleep over her alarm once last night. Know to pump with gentle stimulation to move milk. Overall supply is good. Discussed when to remove shield. Will continue to monitor.

## 2023-12-17 ENCOUNTER — LACTATION ENCOUNTER (OUTPATIENT)
Age: 33
End: 2023-12-17

## 2023-12-17 NOTE — LACTATION NOTE
This note was copied from a baby's chart.  Lactation in to help with Nikki's feed. Baby wide awake, not cueing at this time. Opens to feed, but does not make a seal. No interest in breastfeeding at this time. Sushila to do STS.

## 2023-12-18 ENCOUNTER — LACTATION ENCOUNTER (OUTPATIENT)
Age: 33
End: 2023-12-18

## 2023-12-18 NOTE — LACTATION NOTE
"This note was copied from a baby's chart.  Lactation Visit: Infant placed STS. Mother encouraged to put milk on tip of shield and infant latched on. Nikki was awake and feeding well Nikki sounded stuffed up at the breast and needed to pause a bit. Feeding finished and infant had transferred 12 mls per scale. Sushila is feeling discouraged states, \" she wanted to exclusively breast feed Nikki.\"Acknowledged her feelings and then encouraged her to look at the positives of how Nikki is doing and to try and stay calm and relaxed while feeding. She looks tense and tight in the shoulders at times. She agreed she often feels tense because she so badly wants it to go well. Ecouaged her and gave her positive feedback on how well she is doing.   "

## 2023-12-19 ENCOUNTER — LACTATION ENCOUNTER (OUTPATIENT)
Age: 33
End: 2023-12-19

## 2023-12-19 NOTE — LACTATION NOTE
"This note was copied from a baby's chart.  Lactation visit with breastfeed- Nikki was already at breast when writer came to room. Bedside RN reports Nikki was in football hold and the switched to cross cradle.  Nikki alert during feed and took 3 sucks with swallows but writer observed some stridor during feeding and brief increase in respirations.  Nikki pacing herself and taking breaks to \"catch her breathe\".  After 10 minutes Nikki pulling off breast and getting sleepy.  Scale indicated transfer of 10 ml.  Reported stridor and respirations to bedside RN.  Support and encouragement provided.  "

## 2023-12-20 ENCOUNTER — LACTATION ENCOUNTER (OUTPATIENT)
Dept: OTHER | Facility: CLINIC | Age: 33
End: 2023-12-20

## 2023-12-21 ENCOUNTER — LACTATION ENCOUNTER (OUTPATIENT)
Dept: OTHER | Facility: CLINIC | Age: 33
End: 2023-12-21

## 2023-12-21 NOTE — LACTATION NOTE
This note was copied from a baby's chart.  Lactation visit during breastfeed.  Nikki alert- Sushila independent with latching on left breast in cross cradle- milk in shield.  Nikki with some initial short bursts of sucks/swallows and then did have some stridor and would pause- reviewed cues and letting Nikki pause and not using breast compressions during pause.  Nikki getting sleepy quickly- Sushila wanting to switch to football hold.  Nikki took short burst of nutritive sucks with couple of swallows heard- followed by some mild stridor.  Nikki pulling off breast and showing stop cues- reviewed with Sushila.  Scale indicating transfer of 6 ml.  Support provided. Bedside RN updated on visit.

## 2023-12-21 NOTE — LACTATION NOTE
This note was copied from a baby's chart.  Lactation Visit: Nikki is stuffy and was having a hard time in the cradle hold eating. Nikki is pulling off of breast to mouth breathe. Switched infant to the football hold and infant had a few good bursts of sucking and swallowing. Infant took 4 mls. Updated bedside nurse on feeding and stuffiness.

## 2023-12-22 ENCOUNTER — LACTATION ENCOUNTER (OUTPATIENT)
Dept: OTHER | Facility: CLINIC | Age: 33
End: 2023-12-22

## 2023-12-22 NOTE — LACTATION NOTE
This note was copied from a baby's chart.  Lactation visit with breastfeed- Nikki alert and brought to right breast in cross cradle- licking/suckling initially but not latching.  Has some intermittent nasal stuffiness.  Switched to football hold and started short bursts of sucks- stridor with 1-2 swallows and then brief increased respirations.  After pause Nikki took few short bursts of sucks/swallows again but did have brief choking/cough episode- removed from breast- no desats noted and MD at bedside during episode reviewed plan of care with Sushila.  Per scale Nikki transferred 6 ml.  Reinforced following Nikki's cues at breast with Sushila- Nikki pulling off breast and not interested after few minutes. Bedside RN updated.

## 2023-12-23 ENCOUNTER — LACTATION ENCOUNTER (OUTPATIENT)
Dept: OTHER | Facility: CLINIC | Age: 33
End: 2023-12-23

## 2023-12-23 NOTE — LACTATION NOTE
This note was copied from a baby's chart.  Lactation visit for breastfeed.  Nikki alert and brought to left breast in cross cradle hold.  Writer suggested not using breast compressions during this feed d/t having stridor at breast, tachypnea after one swallow, and choking episode yesterday.  OT at bedside and reports having similar observations with bottle feeds.  Discussed attempting to slow flow and holding off on compressions to see if Nikki tolerates suck/swallow/breath coordination better.     Nikki was able to have 7-9 suck bursts without compressions today.  Previous feeds this week was only 2-3 suck bursts.  Nikki paced herself and then continued suck pattern.  Brief increased respirations after longer suck patterns, still had some stridor/noisy throat noise but improved from yesterday.  Nikki was able to sustain suck/swallow pattern for about 10 minutes and scale indicated transfer of 12 ml.  Bedside RN updated on visit.

## 2023-12-25 ENCOUNTER — LACTATION ENCOUNTER (OUTPATIENT)
Dept: OTHER | Facility: CLINIC | Age: 33
End: 2023-12-25

## 2023-12-26 ENCOUNTER — LACTATION ENCOUNTER (OUTPATIENT)
Dept: OTHER | Facility: CLINIC | Age: 33
End: 2023-12-26

## 2023-12-26 NOTE — LACTATION NOTE
This note was copied from a baby's chart.  Lactation in to visit with Nikki's 1300 feed. Nikki was awake and cueing at time of feed. Nursed on left breast in cross cradle. Continued with plan of no breast compressions during feed- did good pacing at breast. Needing small breaks to catch her breath. Does continue to have some stridor when overwhelmed by milk flow. Needing some prompting to continue to suck at the start of the feed. Nikki nursed for 15 minutes with a transfer of 18 mls. Praise given to Sushila. Sushila questioning if she should add another breastfeed session. With discussions decided to just stick to the one as to keep her PO volumes up. Linda bedside nurse updated on feed. Will continue to be available as needed.

## 2023-12-26 NOTE — LACTATION NOTE
This note was copied from a baby's chart.  Lactation visit for breastfeed.  Nikki sleeping-- not cueing. Brought STS to right breast however with nose to nipple Nikki did not have gape response or attempt to latch.  Brought STS to chest and Nikki sleeping.  Sushila will do STS during tube feed d/t Nikki sleeping this visit.

## 2023-12-28 ENCOUNTER — LACTATION ENCOUNTER (OUTPATIENT)
Dept: OTHER | Facility: CLINIC | Age: 33
End: 2023-12-28

## 2023-12-28 NOTE — LACTATION NOTE
This note was copied from a baby's chart.  Lactation visit for breastfeed.  Nikki cueing and eager to latch.  Moved into suck pattern of 7-9 sucks with 2-3 swallows and then would pause to catch breath. Less stridor noises heard today. Nikki sustained latch but did pull off after just a few minutes and start pushing away at breast.  Burped and switched to football hold however Nikki not opening to latch but remains alert.  Per bedside RN Nikki is on cue based feeds now and has started increasing po bottle feeds- will take bottle after breastfeed today.  Encouraged to keep total feeding time between breast and bottle to no more than 30 minutes for energy conservation.    Scale indicating transfer of 2 ml however several swallows observed during feed.    Nikki eager with bottle and Sushila working on paced bottle feeding.  Sushila preparing for discharge and asking about outpatient lactation resources- resources provided. Praise and encouragement provided.

## 2023-12-29 ENCOUNTER — LACTATION ENCOUNTER (OUTPATIENT)
Dept: OTHER | Facility: CLINIC | Age: 33
End: 2023-12-29

## 2023-12-30 NOTE — LACTATION NOTE
This note was copied from a baby's chart.  Lactation in to see patient. Baby showing signs of feeding interest. Brought to the breast STS with shield. Infant latched and started off nursing well. Quiet with feed. Some swallows heard. Few minutes in to feed, Sushila had a let down. Baby tolerated well taking breath breaks. Having a hard time getting back to a rhythmic suck pattern post let down. Transfer of 4 mls per scale. Will bottle feed remaining volume.

## 2024-01-02 ENCOUNTER — MEDICAL CORRESPONDENCE (OUTPATIENT)
Dept: HEALTH INFORMATION MANAGEMENT | Facility: CLINIC | Age: 34
End: 2024-01-02

## 2024-03-19 ENCOUNTER — OFFICE VISIT (OUTPATIENT)
Dept: CARDIOLOGY | Facility: CLINIC | Age: 34
End: 2024-03-19
Payer: COMMERCIAL

## 2024-03-19 VITALS
DIASTOLIC BLOOD PRESSURE: 68 MMHG | BODY MASS INDEX: 17.95 KG/M2 | HEART RATE: 76 BPM | WEIGHT: 132.5 LBS | HEIGHT: 72 IN | SYSTOLIC BLOOD PRESSURE: 106 MMHG

## 2024-03-19 DIAGNOSIS — R07.89 ATYPICAL CHEST PAIN: ICD-10-CM

## 2024-03-19 DIAGNOSIS — Z13.6 SCREENING FOR CARDIOVASCULAR CONDITION: Primary | ICD-10-CM

## 2024-03-19 PROCEDURE — 93000 ELECTROCARDIOGRAM COMPLETE: CPT | Performed by: INTERNAL MEDICINE

## 2024-03-19 PROCEDURE — 99203 OFFICE O/P NEW LOW 30 MIN: CPT | Performed by: INTERNAL MEDICINE

## 2024-03-19 NOTE — PROGRESS NOTES
Cardiology Clinic Consultation:    March 19, 2024   Patient Name: Sushila Shafer  Patient MRN: [id number]    Consult indication: chest pain    HPI:    I had the opportunity to see patient Sushila Shafer in cardiology clinic for a consultation.     Patient is a pleasant 33-year-old female with no significant past medical history presents for further evaluation management of chest discomfort.  Patient gave birth to her second child about 5 months ago.  Patient had been in her usual state of health until about a month ago when she developed a cough, rhinorrhea.  The symptoms persisted for about a week or so.  Approximately 2 weeks ago she started experiencing intermittent chest discomfort, located primarily in the center of her chest.  These episodes occur about 30 minutes after eating, and last for a few minutes at a time.  Not associated with dyspnea, diaphoresis, no radiation to her shoulders or jaw.  Not positional in nature.  Patient reports that she traverses stairs at home frequently, denies any exertional chest discomfort/chest pain.  Patient denies any recent travel/long car rides or prolonged sedentary activity.     No family history of early ASCVD.      Patient does not smoke cigarettes.  Drinks alcohol only occasionally.     BP today in clinic 106/60 mmHg.  ECG today in clinic demonstrates normal sinus rhythm without acute ischemic changes, no significant ST segment elevation or [place] depression to suggest pericarditis.        Assessment and Plan/Recommendations:    # Chest pain, overall clinical presentation is not classically characteristic of myocardial ischemia, nor pericarditis.  ECG today in clinic is benign.  The postprandial timing of her chest discomfort raises suspicion for GERD/indigestion.  On physical exam she did note some discomfort with palpation of the chest wall just lateral to the sternum at the rib joints, though it seems that this discomfort was somewhat different than the  symptoms for which she presents, and so costochondritis seems less likely.  Recommended further evaluation with a TTE, labs including inflammation labs and troponin, patient would like to hold off for now in favor of a conservative approach of close monitoring, advised patient to alert our team if experiencing persistent or worsening symptoms, or if she notices that her symptoms are becoming exertional in nature.  Reasonable to consider a trial of over-the-counter GERD/acid reflux therapy.  Follow-up in cardiology clinic as needed.      Thank you for allowing our team to participate in the care of Sushila Shafer.  Please do not hesitate to call or page me with any questions or concerns.    Sincerely,     Don Bird MD, [facility]  Cardiology  2024      Total time spent on this encounter today: Greater than 35 minutes, providing care in this encounter including, but not limited to, reviewing prior medical records, laboratory data, imaging studies, diagnostic studies, procedure notes, formulating an assessment and plan, recommendations, discussion and counseling with patient face to face, dictation.    Past Medical History:     Patient Active Problem List   Diagnosis    Abnormal Pap smear of cervix    COVID-19 affecting pregnancy, antepartum    Fetal arrhythmia affecting pregnancy, antepartum    Migraines    Circumvallate placenta     premature rupture of membranes (PPROM) with unknown onset of labor       Past Surgical History:   Past Surgical History:   Procedure Laterality Date     SECTION N/A 10/30/2023    Procedure:  section;  Surgeon: Kaylah Quezada DO;  Location: [facility] OR    MAMMOPLASTY AUGMENTATION         Medications (outpatient):  Current Outpatient Medications   Medication Sig Dispense Refill    Multiple Vitamins-Minerals (MULTIVITAMIN WOMEN PO) Take 1 tablet by mouth daily One-A-Day women's petite      Calcium-Phosphorus-Vitamin D (CALCIUM/D3 ADULT  GUMMIES PO) Take 2 chew tab by mouth daily Vitafusion calcium gummies (Patient not taking: Reported on 3/19/2024)      norethindrone (MICRONOR) 0.35 MG tablet Take 1 tablet (0.35 mg) by mouth daily (Patient not taking: Reported on 3/19/2024) 112 tablet 3    Omega-3 Fatty Acids (FISH OIL PO) Take 1,650 mg by mouth daily (Patient not taking: Reported on 3/19/2024)      oxyCODONE (ROXICODONE) 5 MG tablet Take 1 tablet (5 mg) by mouth every 4 hours as needed for breakthrough pain (Patient not taking: Reported on 12/13/2023) 10 tablet 0    Prenatal Vit-Fe Fumarate-FA (PNV PRENATAL PLUS MULTIVITAMIN) 27-1 MG TABS per tablet Take 1 tablet by mouth daily (Patient not taking: Reported on 3/19/2024)      Probiotic Product (PROBIOTIC DAILY PO) Take 1 capsule by mouth daily Women's Care probiotic (Patient not taking: Reported on 3/19/2024)      senna-docusate (SENOKOT-S/PERICOLACE) 8.6-50 MG tablet Take 2 tablets by mouth 2 times daily (Patient not taking: Reported on 12/13/2023) 30 tablet 0       Allergies:  Allergies   Allergen Reactions    Azithromycin Hives, Itching and Rash     Other reaction(s): Itching, Pruritus      Lactose Other (See Comments)    Penicillins Rash       Social History:   History   Drug Use Unknown      History   Smoking Status    Never   Smokeless Tobacco    Never     Social History    Substance and Sexual Activity      Alcohol use: Not Currently       Family History:  Family History   Problem Relation Age of Onset    No Known Problems Mother     No Known Problems Father        Review of Systems:   A comprehensive 12 system review of systems was carried out.  Pertinent positives and negatives are noted above. Otherwise negative for contributory information.    Objective & Physical Exam:  /68   Pulse 76   Ht 1.829 m (6')   Wt 60.1 kg (132 lb 8 oz)   BMI 17.97 kg/m    Wt Readings from Last 2 Encounters:   03/19/24 60.1 kg (132 lb 8 oz)   12/13/23 64.9 kg (143 lb)     Body mass index is 17.97  kg/m .   Body surface area is 1.75 meters squared.    Constitutional: appears stated age, in no apparent distress, appears to be well nourished  Pulmonary: clear to auscultation bilaterally, no wheezes, no rales, no increased work of breathing  Cardiovascular: JVP normal, regular rate, regular rhythm, normal S1 and S2, no S3, S4, no murmur appreciated. Mild discomfort with palpation of the chest wall lateral to the sternum at the rib joints.   Gastrointestinal: no guarding, non-rigid   Neurologic: awake, alert, moves all extremities  Skin: no jaundice, warm on limited exam    Data reviewed:  Lab Results   Component Value Date    WBC 15.1 (H) 10/14/2023    RBC 4.71 10/14/2023    HGB 12.2 10/31/2023    HCT 42.6 10/14/2023    MCV 90 10/14/2023    MCH 31.8 10/14/2023    MCHC 35.2 10/14/2023    RDW 13.1 10/14/2023     10/14/2023     Glucose   Date Value Ref Range Status   10/27/2023 117 (H) 70 - 99 mg/dL Final     GLUCOSE BY METER POCT   Date Value Ref Range Status   10/16/2023 107 (H) 70 - 99 mg/dL Final     Creatinine   Date Value Ref Range Status   10/14/2023 0.46 (L) 0.51 - 0.95 mg/dL Final     GFR Estimate   Date Value Ref Range Status   10/14/2023 >90 >60 mL/min/1.73m2 Final

## 2024-03-19 NOTE — LETTER
3/19/2024    Physician No Ref-Primary  No address on file    RE: Sushila Shafer       Dear Colleague,     I had the pleasure of seeing Sushila Shafer in the [facility].      Cardiology Clinic Consultation:    March 19, 2024   Patient Name: Sushila Shafer  Patient MRN: [id number]    Consult indication: chest pain    HPI:    I had the opportunity to see patient Sushila Shafer in cardiology clinic for a consultation.     Patient is a pleasant 33-year-old female with no significant past medical history presents for further evaluation management of chest discomfort.  Patient gave birth to her second child about 5 months ago.  Patient had been in her usual state of health until about a month ago when she developed a cough, rhinorrhea.  The symptoms persisted for about a week or so.  Approximately 2 weeks ago she started experiencing intermittent chest discomfort, located primarily in the center of her chest.  These episodes occur about 30 minutes after eating, and last for a few minutes at a time.  Not associated with dyspnea, diaphoresis, no radiation to her shoulders or jaw.  Not positional in nature.  Patient reports that she traverses stairs at home frequently, denies any exertional chest discomfort/chest pain.  Patient denies any recent travel/long car rides or prolonged sedentary activity.     No family history of early ASCVD.      Patient does not smoke cigarettes.  Drinks alcohol only occasionally.     BP today in clinic 106/60 mmHg.  ECG today in clinic demonstrates normal sinus rhythm without acute ischemic changes, no significant ST segment elevation or NV depression to suggest pericarditis.        Assessment and Plan/Recommendations:    # Chest pain, overall clinical presentation is not classically characteristic of myocardial ischemia, nor pericarditis.  ECG today in clinic is benign.  The postprandial timing of her chest discomfort raises suspicion for GERD/indigestion.  On  physical exam she did note some discomfort with palpation of the chest wall just lateral to the sternum at the rib joints, though it seems that this discomfort was somewhat different than the symptoms for which she presents, and so costochondritis seems less likely.  Recommended further evaluation with a TTE, labs including inflammation labs and troponin, patient would like to hold off for now in favor of a conservative approach of close monitoring, advised patient to alert our team if experiencing persistent or worsening symptoms, or if she notices that her symptoms are becoming exertional in nature.  Reasonable to consider a trial of over-the-counter GERD/acid reflux therapy.  Follow-up in cardiology clinic as needed.      Thank you for allowing our team to participate in the care of Sushila Shafer.  Please do not hesitate to call or page me with any questions or concerns.    Sincerely,     Don Bird MD, [facility]  Cardiology  2024      Total time spent on this encounter today: Greater than 35 minutes, providing care in this encounter including, but not limited to, reviewing prior medical records, laboratory data, imaging studies, diagnostic studies, procedure notes, formulating an assessment and plan, recommendations, discussion and counseling with patient face to face, dictation.    Past Medical History:     Patient Active Problem List   Diagnosis    Abnormal Pap smear of cervix    COVID-19 affecting pregnancy, antepartum    Fetal arrhythmia affecting pregnancy, antepartum    Migraines    Circumvallate placenta     premature rupture of membranes (PPROM) with unknown onset of labor       Past Surgical History:   Past Surgical History:   Procedure Laterality Date     SECTION N/A 10/30/2023    Procedure:  section;  Surgeon: Kaylah Quezada DO;  Location: [facility] OR    MAMMOPLASTY AUGMENTATION         Medications (outpatient):  Current Outpatient Medications    Medication Sig Dispense Refill    Multiple Vitamins-Minerals (MULTIVITAMIN WOMEN PO) Take 1 tablet by mouth daily One-A-Day women's petite      Calcium-Phosphorus-Vitamin D (CALCIUM/D3 ADULT GUMMIES PO) Take 2 chew tab by mouth daily Vitafusion calcium gummies (Patient not taking: Reported on 3/19/2024)      norethindrone (MICRONOR) 0.35 MG tablet Take 1 tablet (0.35 mg) by mouth daily (Patient not taking: Reported on 3/19/2024) 112 tablet 3    Omega-3 Fatty Acids (FISH OIL PO) Take 1,650 mg by mouth daily (Patient not taking: Reported on 3/19/2024)      oxyCODONE (ROXICODONE) 5 MG tablet Take 1 tablet (5 mg) by mouth every 4 hours as needed for breakthrough pain (Patient not taking: Reported on 12/13/2023) 10 tablet 0    Prenatal Vit-Fe Fumarate-FA (PNV PRENATAL PLUS MULTIVITAMIN) 27-1 MG TABS per tablet Take 1 tablet by mouth daily (Patient not taking: Reported on 3/19/2024)      Probiotic Product (PROBIOTIC DAILY PO) Take 1 capsule by mouth daily Women's Care probiotic (Patient not taking: Reported on 3/19/2024)      senna-docusate (SENOKOT-S/PERICOLACE) 8.6-50 MG tablet Take 2 tablets by mouth 2 times daily (Patient not taking: Reported on 12/13/2023) 30 tablet 0       Allergies:  Allergies   Allergen Reactions    Azithromycin Hives, Itching and Rash     Other reaction(s): Itching, Pruritus      Lactose Other (See Comments)    Penicillins Rash       Social History:   History   Drug Use Unknown      History   Smoking Status    Never   Smokeless Tobacco    Never     Social History    Substance and Sexual Activity      Alcohol use: Not Currently       Family History:  Family History   Problem Relation Age of Onset    No Known Problems Mother     No Known Problems Father        Review of Systems:   A comprehensive 12 system review of systems was carried out.  Pertinent positives and negatives are noted above. Otherwise negative for contributory information.    Objective & Physical Exam:  /68   Pulse 76    Ht 1.829 m (6')   Wt 60.1 kg (132 lb 8 oz)   BMI 17.97 kg/m    Wt Readings from Last 2 Encounters:   03/19/24 60.1 kg (132 lb 8 oz)   12/13/23 64.9 kg (143 lb)     Body mass index is 17.97 kg/m .   Body surface area is 1.75 meters squared.    Constitutional: appears stated age, in no apparent distress, appears to be well nourished  Pulmonary: clear to auscultation bilaterally, no wheezes, no rales, no increased work of breathing  Cardiovascular: JVP normal, regular rate, regular rhythm, normal S1 and S2, no S3, S4, no murmur appreciated. Mild discomfort with palpation of the chest wall lateral to the sternum at the rib joints.   Gastrointestinal: no guarding, non-rigid   Neurologic: awake, alert, moves all extremities  Skin: no jaundice, warm on limited exam    Data reviewed:  Lab Results   Component Value Date    WBC 15.1 (H) 10/14/2023    RBC 4.71 10/14/2023    HGB 12.2 10/31/2023    HCT 42.6 10/14/2023    MCV 90 10/14/2023    MCH 31.8 10/14/2023    MCHC 35.2 10/14/2023    RDW 13.1 10/14/2023     10/14/2023     Glucose   Date Value Ref Range Status   10/27/2023 117 (H) 70 - 99 mg/dL Final     GLUCOSE BY METER POCT   Date Value Ref Range Status   10/16/2023 107 (H) 70 - 99 mg/dL Final     Creatinine   Date Value Ref Range Status   10/14/2023 0.46 (L) 0.51 - 0.95 mg/dL Final     GFR Estimate   Date Value Ref Range Status   10/14/2023 >90 >60 mL/min/1.73m2 Final         Thank you for allowing me to participate in the care of your patient.      Sincerely,     Don Bird MD     [facility]

## 2024-03-19 NOTE — PATIENT INSTRUCTIONS
March 19, 2024    Thank you for allowing our Cardiology team to participate in your care.     Please note the following changes to your heart treatment plan:     Medication changes:   - none, consider OTC acid reflux medication    Tests to be done:  - TTE (heart ultrasound)    Follow up:  - Follow up as needed      For scheduling, please call [phone].      Please contact our team through [employer] or our Nurse Team Voicemail service [phone], or the General Clinic [phone] for any questions or concerns.     If you are having a medical emergency, please call [phone].     Sincerely,    Don Bird MD, FACC  Cardiology    [facility]

## 2024-04-22 ENCOUNTER — MYC REFILL (OUTPATIENT)
Dept: OBGYN | Facility: CLINIC | Age: 34
End: 2024-04-22

## 2024-04-22 DIAGNOSIS — Z30.011 ENCOUNTER FOR INITIAL PRESCRIPTION OF CONTRACEPTIVE PILLS: ICD-10-CM

## 2024-04-23 RX ORDER — ACETAMINOPHEN AND CODEINE PHOSPHATE 120; 12 MG/5ML; MG/5ML
0.35 SOLUTION ORAL DAILY
Qty: 112 TABLET | Refills: 2 | Status: SHIPPED | OUTPATIENT
Start: 2024-04-23

## 2024-08-08 ENCOUNTER — TRANSFERRED RECORDS (OUTPATIENT)
Dept: HEALTH INFORMATION MANAGEMENT | Facility: CLINIC | Age: 34
End: 2024-08-08
Payer: MEDICAID

## 2024-11-04 ENCOUNTER — NURSE TRIAGE (OUTPATIENT)
Dept: NURSING | Facility: CLINIC | Age: 34
End: 2024-11-04
Payer: MEDICAID

## 2024-11-04 NOTE — TELEPHONE ENCOUNTER
Patient calling reports taking Doxycycline and was having tingling, burning and numbness in her hand. Reports it happened suddenly today. Denies weakness. Advised to call 911 per protocol with patient expressing understanding but declined to agree to disposition.     Jordana Maurer RN 11/04/24 5:50 PM    Health Triage Nurse Advisor      Reason for Disposition   [1] Numbness (i.e., loss of sensation) of the face, arm / hand, or leg / foot on one side of the body AND [2] sudden onset AND [3] present now    Additional Information   Negative: [1] SEVERE weakness (i.e., unable to walk or barely able to walk, requires support) AND [2] new-onset or worsening   Negative: [1] Weakness (i.e., paralysis, loss of muscle strength) of the face, arm / hand, or leg / foot on one side of the body AND [2] sudden onset AND [3] present now  (Exception: Bell's palsy suspected [i.e., weakness only on one side of the face, developing over hours to days, no other symptoms].)    Protocols used: Neurologic Deficit-A-AH

## 2024-12-15 ENCOUNTER — HEALTH MAINTENANCE LETTER (OUTPATIENT)
Age: 34
End: 2024-12-15

## 2025-02-26 DIAGNOSIS — Z30.011 ENCOUNTER FOR INITIAL PRESCRIPTION OF CONTRACEPTIVE PILLS: ICD-10-CM

## 2025-02-26 NOTE — TELEPHONE ENCOUNTER
Last OV 12/13/23.  My chart message sent to the pt.     Once she makes an appt, we can send in a refill.      JOCELYN Selby OB/GYN

## 2025-03-04 RX ORDER — ACETAMINOPHEN AND CODEINE PHOSPHATE 120; 12 MG/5ML; MG/5ML
0.35 SOLUTION ORAL DAILY
Qty: 112 TABLET | Refills: 0 | OUTPATIENT
Start: 2025-03-04

## 2025-03-07 RX ORDER — DOXYCYCLINE HYCLATE 100 MG
1 TABLET ORAL
COMMUNITY
Start: 2024-10-25 | End: 2025-03-10

## 2025-03-07 RX ORDER — PROGESTERONE 200 MG/1
CAPSULE ORAL
COMMUNITY
Start: 2024-04-04 | End: 2025-03-10

## 2025-03-07 NOTE — PROGRESS NOTES
Sushila is a 34 year old  female who presents for annual exam.     Besides routine health maintenance, she has no other health concerns today .  HPI:    Sushila is here for her annual exam.   She continues on progestin only pill due to recent breastfeeding and hx migraine with aura. She weaned from breastfeeding in March and had her first menses beginning 3/1.   Reports yellow discharge. Denies itching, odor, pain or urinary changes.  Menses are irregular. 1st cycle since baby was born 18 months ago. lasting 2 days.   Menses flow: normal.    Patient's last menstrual period was 2025 (approximate)..   Using oral contraceptives for contraception.    She is not currently considering pregnancy.    REPRODUCTIVE/GYNECOLOGIC HISTORY:  Sushila is sexually active with male partner(s) and is currently in monogamous relationship.   STI testing offered?  Declined  History of abnormal Pap smear:  Yes  SOCIAL HISTORY  Abuse: does not report having previously been physical or sexually abused.    Do you feel safe in your environment? YES       She  reports that she has never smoked. She has never used smokeless tobacco.      Last PHQ-9 score on record =       2023     3:45 PM   PHQ-9 SCORE   PHQ-9 Total Score 2     Last GAD7 score on record =        No data to display                HEALTHY HABITS  Eating habits: eats regular meals, follows a balanced nutrition diet, takes calcium supplements, and takes daily vitamins  Calcium/Vitamin D intake: source:  inconsistently takes dietary supplement  Exercise: How often do you exercise? None currently  Have you had an eye exam in the last two years? YES     Do you routinely see the dentist once or twice yearly? YES         HISTORY:  OB History    Para Term  AB Living   5 2 1 1 3 2   SAB IAB Ectopic Multiple Live Births   3 0 0 0 2      # Outcome Date GA Lbr Conner/2nd Weight Sex Type Anes PTL Lv   5  10/30/23 31w1d  1.52 kg (3 lb 5.6 oz) F CS-Classical  Spinal Y YRIS      Name: Female Sushila Shafer      Apgar1: 8  Apgar5: 8   4 SAB 01/10/23           3 SAB 10/2022           2 SAB 22 7w0d          1 Term 20 41w0d  3.657 kg (8 lb 1 oz) M Vag-Spont   YRIS      Name: Dylan      Apgar1: 8  Apgar5: 9     Past Medical History:   Diagnosis Date    Ocular migraine      Past Surgical History:   Procedure Laterality Date     SECTION N/A 10/30/2023    Procedure:  section;  Surgeon: Kaylah Quezada DO;  Location: [facility] OR    MAMMOPLASTY AUGMENTATION       Family History   Problem Relation Age of Onset    No Known Problems Mother     No Known Problems Father      Social History     Socioeconomic History    Marital status:    Occupational History    Occupation: works from home   Tobacco Use    Smoking status: Never    Smokeless tobacco: Never   Vaping Use    Vaping status: Never Used   Substance and Sexual Activity    Alcohol use: Not Currently    Drug use: Never    Sexual activity: Yes     Partners: Male     Social Drivers of Health     Financial Resource Strain: Not on File (2019)    Received from [facility]    Financial Resource Strain     Financial Resource Strain: 0   Food Insecurity: Not on File (2019)    Received from [facility]    Food Insecurity     Food: 0   Transportation Needs: Not on File (2019)    Received from [facility]    Transportation Needs     Transportation: 0   Physical Activity: Not on File (2019)    Received from [facility]    Physical Activity     Physical Activity: 0   Stress: Not on File (2019)    Received from [facility]    Stress     Stress: 0   Social Connections: Not on File (2019)    Received from [facility]    Social Connections     Social Connections and Isolation: 0   Housing Stability: Not on File (2019)    Received from [facility]    Housing Stability     Housin       Current Outpatient Medications:     Multiple Vitamins-Minerals (MULTIVITAMIN  "WOMEN PO), Take 1 tablet by mouth daily One-A-Day women's petite, Disp: , Rfl:     norethindrone (MICRONOR) 0.35 MG tablet, Take 1 tablet (0.35 mg) by mouth daily., Disp: 112 tablet, Rfl: 2    Probiotic Product (PROBIOTIC DAILY PO), Take 1 capsule by mouth daily. Women's Care probiotic, Disp: , Rfl:      Allergies   Allergen Reactions    Azithromycin Hives, Itching and Rash     Other reaction(s): Itching, Pruritus      Lactose Other (See Comments)    Penicillins Rash       Past medical, surgical, social and family history were reviewed and updated in EPIC.    ROS:   12 point review of systems negative other than symptoms noted below or in the HPI.  Neurologic: Headaches    PHYSICAL EXAM:  /66   Ht 1.803 m (5' 11\")   Wt 60.8 kg (134 lb)   LMP 02/26/2025 (Approximate)   Breastfeeding No   BMI 18.69 kg/m     BMI: Body mass index is 18.69 kg/m .  Constitutional: healthy, alert and no distress  Neck: symmetrical, thyroid normal size, no masses present, no lymphadenopathy present.   Cardiovascular: RRR, no murmurs present  Respiratory: breathing unlabored, lungs CTA bilaterally  Breast: breast implants in place, normal without masses, tenderness or nipple discharge and no palpable axillary masses or adenopathy  Gastrointestinal: abdomen soft, non-tender  PELVIC EXAM:  Vulva: No lesions, no adenopathy, BUS WNL  Vagina: Moist, pink, discharge normal, well rugated, no lesions  Cervix: smooth, pink, no visible lesions  Rectal exam: deferred    ASSESSMENT/PLAN:    ICD-10-CM    1. Encounter for gynecological examination without abnormal finding  Z01.419 norethindrone (MICRONOR) 0.35 MG tablet     HPV and Gynecologic Cytology Panel - Recommended Age 30-65 Years     TSH with free T4 reflex     Comprehensive metabolic panel (BMP + Alb, Alk Phos, ALT, AST, Total. Bili, TP)     Lipid panel reflex to direct LDL Fasting     Multiplex Vaginal Panel by PCR      2. Encounter for initial prescription of contraceptive pills  " Z30.011 norethindrone (MICRONOR) 0.35 MG tablet      3. Screening for metabolic disorder  Z13.228 TSH with free T4 reflex     Comprehensive metabolic panel (BMP + Alb, Alk Phos, ALT, AST, Total. Bili, TP)     Lipid panel reflex to direct LDL Fasting      4. Vaginal discharge  N89.8 Multiplex Vaginal Panel by PCR      5. Screening for cervical cancer  Z12.4 HPV and Gynecologic Cytology Panel - Recommended Age 30-65 Years        No results found for any visits on 03/10/25.    COUNSELING:   Reviewed preventive health counseling, as reflected in patient instructions       Regular exercise       Healthy diet/nutrition       Contraception       Osteoporosis prevention/bone health  Discussed the importance of weight bearing exercise and consistent use of calcium supplementation for bone density - especially considering she is lactose intolerant and doesn't get dairy through her diet.  Screening labs ordered, will schedule future lab appointment  Pap per ASCCP guidelines  Refill for POP x 1 year  MVP done d/t vaginal discharge, will treat as clinically indicated  Return to clinic 1 year for annual exam    Kael Alaniz, Student Nurse Midwife      I was present with the student who participated in the service and in the documentation of the note. I have verified the history and personally performed the physical exam and medical decision-making. I agree with the assessment and plan of care as documented in the note.    Jillian GOETZ, STEPHAN, [facility]  [phone]

## 2025-03-10 ENCOUNTER — OFFICE VISIT (OUTPATIENT)
Dept: MIDWIFE SERVICES | Facility: CLINIC | Age: 35
End: 2025-03-10
Payer: COMMERCIAL

## 2025-03-10 VITALS
DIASTOLIC BLOOD PRESSURE: 66 MMHG | SYSTOLIC BLOOD PRESSURE: 100 MMHG | BODY MASS INDEX: 18.76 KG/M2 | HEIGHT: 71 IN | WEIGHT: 134 LBS

## 2025-03-10 DIAGNOSIS — Z12.4 SCREENING FOR CERVICAL CANCER: ICD-10-CM

## 2025-03-10 DIAGNOSIS — Z13.228 SCREENING FOR METABOLIC DISORDER: ICD-10-CM

## 2025-03-10 DIAGNOSIS — Z30.011 ENCOUNTER FOR INITIAL PRESCRIPTION OF CONTRACEPTIVE PILLS: ICD-10-CM

## 2025-03-10 DIAGNOSIS — Z01.419 ENCOUNTER FOR GYNECOLOGICAL EXAMINATION WITHOUT ABNORMAL FINDING: Primary | ICD-10-CM

## 2025-03-10 DIAGNOSIS — N89.8 VAGINAL DISCHARGE: ICD-10-CM

## 2025-03-10 PROBLEM — O43.119 CIRCUMVALLATE PLACENTA: Status: RESOLVED | Noted: 2023-08-11 | Resolved: 2025-03-10

## 2025-03-10 PROBLEM — O98.519 COVID-19 AFFECTING PREGNANCY, ANTEPARTUM: Status: RESOLVED | Noted: 2023-05-25 | Resolved: 2025-03-10

## 2025-03-10 PROBLEM — O42.919 PRETERM PREMATURE RUPTURE OF MEMBRANES (PPROM) WITH UNKNOWN ONSET OF LABOR: Status: RESOLVED | Noted: 2023-10-14 | Resolved: 2025-03-10

## 2025-03-10 PROBLEM — U07.1 COVID-19 AFFECTING PREGNANCY, ANTEPARTUM: Status: RESOLVED | Noted: 2023-05-25 | Resolved: 2025-03-10

## 2025-03-10 PROBLEM — O36.8390 FETAL ARRHYTHMIA AFFECTING PREGNANCY, ANTEPARTUM: Status: RESOLVED | Noted: 2020-05-15 | Resolved: 2025-03-10

## 2025-03-10 PROCEDURE — 81515 NFCT DS BV&VAGINITIS DNA ALG: CPT | Performed by: NURSE PRACTITIONER

## 2025-03-10 PROCEDURE — 99395 PREV VISIT EST AGE 18-39: CPT | Performed by: NURSE PRACTITIONER

## 2025-03-10 PROCEDURE — 99459 PELVIC EXAMINATION: CPT | Performed by: NURSE PRACTITIONER

## 2025-03-10 PROCEDURE — 99212 OFFICE O/P EST SF 10 MIN: CPT | Mod: 25 | Performed by: NURSE PRACTITIONER

## 2025-03-10 RX ORDER — ACETAMINOPHEN AND CODEINE PHOSPHATE 120; 12 MG/5ML; MG/5ML
0.35 SOLUTION ORAL DAILY
Qty: 112 TABLET | Refills: 2 | Status: SHIPPED | OUTPATIENT
Start: 2025-03-10

## 2025-03-11 LAB
BACTERIAL VAGINOSIS VAG-IMP: NEGATIVE
CANDIDA DNA VAG QL NAA+PROBE: NOT DETECTED
CANDIDA GLABRATA / CANDIDA KRUSEI DNA: NOT DETECTED
T VAGINALIS DNA VAG QL NAA+PROBE: NOT DETECTED

## 2025-05-22 ENCOUNTER — OFFICE VISIT (OUTPATIENT)
Dept: INTERNAL MEDICINE | Facility: CLINIC | Age: 35
End: 2025-05-22
Payer: COMMERCIAL

## 2025-05-22 VITALS
HEART RATE: 68 BPM | HEIGHT: 71 IN | BODY MASS INDEX: 18.62 KG/M2 | TEMPERATURE: 97.9 F | RESPIRATION RATE: 16 BRPM | DIASTOLIC BLOOD PRESSURE: 68 MMHG | SYSTOLIC BLOOD PRESSURE: 106 MMHG | WEIGHT: 133 LBS | OXYGEN SATURATION: 100 %

## 2025-05-22 DIAGNOSIS — K64.4 EXTERNAL HEMORRHOIDS: Primary | ICD-10-CM

## 2025-05-22 RX ORDER — HYDROCORTISONE 25 MG/G
CREAM TOPICAL 2 TIMES DAILY PRN
Qty: 20 G | Refills: 0 | Status: SHIPPED | OUTPATIENT
Start: 2025-05-22

## 2025-05-22 ASSESSMENT — PAIN SCALES - GENERAL: PAINLEVEL_OUTOF10: NO PAIN (0)

## 2025-05-22 NOTE — PROGRESS NOTES
"  Assessment & Plan     External hemorrhoids  Will treat with topical steroid and work on not straining with bowel movements and keeping them soft with smooth move tea.   - hydrocortisone, Perianal, (HYDROCORTISONE) 2.5 % cream; Place rectally 2 times daily as needed for hemorrhoids.      20 minutes spent by me on the date of the encounter doing chart review, patient visit, and documentation             Subjective   Sushila is a 35 year old, presenting for the following health issues:  Patient presents to the clinic for a complaint of a hemorrhoid for a couple months. She states bowel movements are normal but strains with periods.   She has not tried anything topically.   It has started to bleed with bowel movements   No anal stimulation       Rectal Problem    History of Present Illness       Reason for visit:  Rectal hemorrhoid  Symptoms include:  Pain, blood in stool  Symptom intensity:  Moderate  Symptom progression:  Worsening  Had these symptoms before:  No  What makes it worse:  Bowel movement  What makes it better:  N/a   She is taking medications regularly.                  Review of Systems  Constitutional, HEENT, cardiovascular, pulmonary, gi and gu systems are negative, except as otherwise noted.      Objective    /68   Pulse 68   Temp 97.9  F (36.6  C)   Resp 16   Ht 1.803 m (5' 11\")   Wt 60.3 kg (133 lb)   SpO2 100%   Breastfeeding No   BMI 18.55 kg/m    Body mass index is 18.55 kg/m .  Physical Exam   GENERAL: alert and no distress  RECTAL (female): external hemorrhoid            Signed Electronically by: SOFY Burrows CNP    "

## 2025-05-22 NOTE — PATIENT INSTRUCTIONS
Constipation:   Smooth Move Tea Daily - coffee and tea aisle at any grocery store     If this does not help, consider over the counter miralax with PLENTY of water.   Can also consider taking Senna over the counter 1-2 times daily until cleared out.

## (undated) DEVICE — SU PDS II 0 CTX 60" Z990G

## (undated) DEVICE — GLOVE BIOGEL PI ULTRATOUCH SZ 6.5 41165

## (undated) DEVICE — GLOVE BIOGEL PI MICRO INDICATOR UNDERGLOVE SZ 7.0 48970

## (undated) DEVICE — DRSG TEGADERM 4X10" 1627

## (undated) DEVICE — PREP CHLORAPREP 26ML TINTED HI-LITE ORANGE 930815

## (undated) DEVICE — BARRIER SEPRAFILM 5X6" SINGLE SHEET 4301-02

## (undated) DEVICE — GLOVE BIOGEL PI MICRO SZ 6.5 48565

## (undated) DEVICE — PACK C-SECTION LF PL15OTA83B

## (undated) DEVICE — SU MONOCRYL 0 CT 36" Y358H

## (undated) DEVICE — CATH TRAY FOLEY SURESTEP 16FR DRAIN BAG STATOCK A899916

## (undated) DEVICE — LINEN DRAPE 54X72" 5467

## (undated) DEVICE — SU MONOCRYL 4-0 PS-2 27" UND Y426H

## (undated) DEVICE — SOL WATER IRRIG 1000ML BOTTLE 2F7114

## (undated) DEVICE — ESU GROUND PAD ADULT W/CORD E7507

## (undated) DEVICE — DRSG TELFA 3X8" 1238

## (undated) DEVICE — SU MONOCRYL 0 CTX 36" Y398H

## (undated) DEVICE — BAG CLEAR TRASH 1.3M 39X33" P4040C

## (undated) DEVICE — LINEN HALF SHEET 5512

## (undated) DEVICE — GLOVE BIOGEL PI MICRO SZ 7.0 48570

## (undated) DEVICE — TRANSFER DEVICE BLOOD NDL HOLDER 364880

## (undated) DEVICE — ESU PENCIL SMOKE EVAC W/ROCKER SWITCH 0703-047-000

## (undated) DEVICE — CAP BABY PINK/BLUE IC-2

## (undated) DEVICE — SOL NACL 0.9% IRRIG 1000ML BOTTLE 2F7124

## (undated) DEVICE — LINEN FULL SHEET 5511

## (undated) DEVICE — Device

## (undated) DEVICE — SU MONOCRYL 3-0 SH 27" Y316H

## (undated) DEVICE — PAD CHUX UNDERPAD 30X36" P3036C

## (undated) DEVICE — STOCKING SLEEVE VASOPRESS COMPRESSION CALF MED 18" VP501M

## (undated) DEVICE — GLOVE BIOGEL PI MICRO INDICATOR UNDERGLOVE SZ 6.5 48965

## (undated) DEVICE — BLADE CLIPPER SGL USE 9680

## (undated) DEVICE — DRSG STERI STRIP 1/2X4" R1547

## (undated) DEVICE — LINEN TOWEL PACK X10 5473